# Patient Record
Sex: FEMALE | URBAN - NONMETROPOLITAN AREA
[De-identification: names, ages, dates, MRNs, and addresses within clinical notes are randomized per-mention and may not be internally consistent; named-entity substitution may affect disease eponyms.]

---

## 2023-11-15 ENCOUNTER — OCCMED (OUTPATIENT)
Dept: URGENT CARE | Facility: CLINIC | Age: 48
End: 2023-11-15

## 2023-11-15 DIAGNOSIS — Z02.1 PRE-EMPLOYMENT EXAMINATION: Primary | ICD-10-CM

## 2023-12-21 ENCOUNTER — HOSPITAL ENCOUNTER (EMERGENCY)
Facility: HOSPITAL | Age: 48
Discharge: HOME | DRG: 552 | End: 2023-12-21
Attending: EMERGENCY MEDICINE
Payer: MEDICARE

## 2023-12-21 ENCOUNTER — APPOINTMENT (EMERGENCY)
Dept: RADIOLOGY | Facility: HOSPITAL | Age: 48
DRG: 552 | End: 2023-12-21
Payer: MEDICARE

## 2023-12-21 VITALS
HEART RATE: 74 BPM | WEIGHT: 180 LBS | HEIGHT: 64 IN | SYSTOLIC BLOOD PRESSURE: 148 MMHG | RESPIRATION RATE: 16 BRPM | DIASTOLIC BLOOD PRESSURE: 66 MMHG | OXYGEN SATURATION: 97 % | TEMPERATURE: 97.8 F | BODY MASS INDEX: 30.73 KG/M2

## 2023-12-21 DIAGNOSIS — M54.50 ACUTE LOW BACK PAIN: Primary | ICD-10-CM

## 2023-12-21 PROCEDURE — 99283 EMERGENCY DEPT VISIT LOW MDM: CPT

## 2023-12-21 PROCEDURE — 99284 EMERGENCY DEPT VISIT MOD MDM: CPT | Performed by: EMERGENCY MEDICINE

## 2023-12-21 PROCEDURE — 72100 X-RAY EXAM L-S SPINE 2/3 VWS: CPT

## 2023-12-21 PROCEDURE — 96372 THER/PROPH/DIAG INJ SC/IM: CPT

## 2023-12-21 RX ORDER — KETOROLAC TROMETHAMINE 30 MG/ML
15 INJECTION, SOLUTION INTRAMUSCULAR; INTRAVENOUS ONCE
Status: COMPLETED | OUTPATIENT
Start: 2023-12-21 | End: 2023-12-21

## 2023-12-21 RX ORDER — LIDOCAINE 50 MG/G
1 PATCH TOPICAL DAILY
Qty: 10 PATCH | Refills: 0 | Status: SHIPPED | OUTPATIENT
Start: 2023-12-21

## 2023-12-21 RX ORDER — LIDOCAINE 50 MG/G
1 PATCH TOPICAL ONCE
Status: DISCONTINUED | OUTPATIENT
Start: 2023-12-21 | End: 2023-12-22 | Stop reason: HOSPADM

## 2023-12-21 RX ORDER — NAPROXEN 500 MG/1
500 TABLET ORAL 2 TIMES DAILY WITH MEALS
Qty: 30 TABLET | Refills: 0 | Status: SHIPPED | OUTPATIENT
Start: 2023-12-21 | End: 2023-12-26

## 2023-12-21 RX ORDER — METHOCARBAMOL 500 MG/1
1000 TABLET, FILM COATED ORAL ONCE
Status: COMPLETED | OUTPATIENT
Start: 2023-12-21 | End: 2023-12-21

## 2023-12-21 RX ORDER — METHOCARBAMOL 500 MG/1
1000 TABLET, FILM COATED ORAL
Qty: 7 TABLET | Refills: 0 | Status: SHIPPED | OUTPATIENT
Start: 2023-12-21

## 2023-12-21 RX ADMIN — KETOROLAC TROMETHAMINE 15 MG: 30 INJECTION, SOLUTION INTRAMUSCULAR; INTRAVENOUS at 22:28

## 2023-12-21 RX ADMIN — LIDOCAINE 5% 1 PATCH: 700 PATCH TOPICAL at 22:32

## 2023-12-21 RX ADMIN — METHOCARBAMOL TABLETS 1000 MG: 500 TABLET, COATED ORAL at 22:26

## 2023-12-21 NOTE — Clinical Note
Luh Chairez was seen and treated in our emergency department on 12/21/2023.                Diagnosis: acute low back pain    Luh  .    She may return on this date: 12/23/2023         If you have any questions or concerns, please don't hesitate to call.      Willie Mcdaniel, DO    ______________________________           _______________          _______________  Hospital Representative                              Date                                Time

## 2023-12-22 ENCOUNTER — HOSPITAL ENCOUNTER (INPATIENT)
Facility: HOSPITAL | Age: 48
LOS: 3 days | Discharge: HOME HEALTH | DRG: 552 | End: 2023-12-26
Attending: EMERGENCY MEDICINE | Admitting: HOSPITALIST
Payer: MEDICARE

## 2023-12-22 ENCOUNTER — APPOINTMENT (EMERGENCY)
Dept: CT IMAGING | Facility: HOSPITAL | Age: 48
DRG: 552 | End: 2023-12-22
Payer: MEDICARE

## 2023-12-22 DIAGNOSIS — R20.0 NUMBNESS AND TINGLING OF RIGHT UPPER AND LOWER EXTREMITY: ICD-10-CM

## 2023-12-22 DIAGNOSIS — R20.2 NUMBNESS AND TINGLING OF RIGHT UPPER AND LOWER EXTREMITY: ICD-10-CM

## 2023-12-22 DIAGNOSIS — R91.1 INCIDENTAL PULMONARY NODULE: ICD-10-CM

## 2023-12-22 DIAGNOSIS — M54.16 LUMBAR RADICULOPATHY: ICD-10-CM

## 2023-12-22 DIAGNOSIS — E55.9 VITAMIN D INSUFFICIENCY: ICD-10-CM
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
## 2023-12-22 DIAGNOSIS — R20.2 PARESTHESIAS: ICD-10-CM

## 2023-12-22 DIAGNOSIS — M54.50 LOW BACK PAIN: ICD-10-CM

## 2023-12-22 DIAGNOSIS — R29.898 LEG WEAKNESS, BILATERAL: ICD-10-CM

## 2023-12-22 PROBLEM — F17.200 SMOKER: Status: ACTIVE | Noted: 2023-12-22

## 2023-12-22 PROBLEM — R82.71 BACTERIURIA: Status: ACTIVE | Noted: 2023-12-22

## 2023-12-22 LAB
ALBUMIN SERPL BCP-MCNC: 4.2 G/DL (ref 3.5–5)
ALP SERPL-CCNC: 43 U/L (ref 34–104)
ALT SERPL W P-5'-P-CCNC: 18 U/L (ref 7–52)
AMORPH PHOS CRY URNS QL MICRO: ABNORMAL /HPF
ANION GAP SERPL CALCULATED.3IONS-SCNC: 9 MMOL/L
APTT PPP: 26 SECONDS (ref 23–37)
AST SERPL W P-5'-P-CCNC: 12 U/L (ref 13–39)
BACTERIA UR QL AUTO: ABNORMAL /HPF
BASOPHILS # BLD AUTO: 0.02 THOUSANDS/ÂΜL (ref 0–0.1)
BASOPHILS NFR BLD AUTO: 0 % (ref 0–1)
BILIRUB DIRECT SERPL-MCNC: 0.06 MG/DL (ref 0–0.2)
BILIRUB SERPL-MCNC: 0.35 MG/DL (ref 0.2–1)
BILIRUB UR QL STRIP: NEGATIVE
BUN SERPL-MCNC: 18 MG/DL (ref 5–25)
CALCIUM SERPL-MCNC: 8.8 MG/DL (ref 8.4–10.2)
CARDIAC TROPONIN I PNL SERPL HS: <2 NG/L
CARDIAC TROPONIN I PNL SERPL HS: <2 NG/L
CHLORIDE SERPL-SCNC: 105 MMOL/L (ref 96–108)
CK SERPL-CCNC: 123 U/L (ref 26–192)
CLARITY UR: ABNORMAL
CO2 SERPL-SCNC: 23 MMOL/L (ref 21–32)
COLOR UR: ABNORMAL
CREAT SERPL-MCNC: 0.75 MG/DL (ref 0.6–1.3)
EOSINOPHIL # BLD AUTO: 0.08 THOUSAND/ÂΜL (ref 0–0.61)
EOSINOPHIL NFR BLD AUTO: 1 % (ref 0–6)
ERYTHROCYTE [DISTWIDTH] IN BLOOD BY AUTOMATED COUNT: 12.3 % (ref 11.6–15.1)
EXT PREGNANCY TEST URINE: NEGATIVE
EXT. CONTROL: NORMAL
FLUAV RNA RESP QL NAA+PROBE: NEGATIVE
FLUBV RNA RESP QL NAA+PROBE: NEGATIVE
GFR SERPL CREATININE-BSD FRML MDRD: 93 ML/MIN/1.73SQ M
GLUCOSE SERPL-MCNC: 85 MG/DL (ref 65–140)
GLUCOSE UR STRIP-MCNC: NEGATIVE MG/DL
HCT VFR BLD AUTO: 42.5 % (ref 34.8–46.1)
HGB BLD-MCNC: 13.8 G/DL (ref 11.5–15.4)
HGB UR QL STRIP.AUTO: NEGATIVE
IMM GRANULOCYTES # BLD AUTO: 0.03 THOUSAND/UL (ref 0–0.2)
IMM GRANULOCYTES NFR BLD AUTO: 0 % (ref 0–2)
INR PPP: 1.05 (ref 0.84–1.19)
KETONES UR STRIP-MCNC: NEGATIVE MG/DL
LACTATE SERPL-SCNC: 0.6 MMOL/L (ref 0.5–2)
LEUKOCYTE ESTERASE UR QL STRIP: ABNORMAL
LIPASE SERPL-CCNC: 30 U/L (ref 11–82)
LYMPHOCYTES # BLD AUTO: 2.16 THOUSANDS/ÂΜL (ref 0.6–4.47)
LYMPHOCYTES NFR BLD AUTO: 25 % (ref 14–44)
MCH RBC QN AUTO: 31.1 PG (ref 26.8–34.3)
MCHC RBC AUTO-ENTMCNC: 32.5 G/DL (ref 31.4–37.4)
MCV RBC AUTO: 96 FL (ref 82–98)
MONOCYTES # BLD AUTO: 0.47 THOUSAND/ÂΜL (ref 0.17–1.22)
MONOCYTES NFR BLD AUTO: 5 % (ref 4–12)
NEUTROPHILS # BLD AUTO: 5.93 THOUSANDS/ÂΜL (ref 1.85–7.62)
NEUTS SEG NFR BLD AUTO: 69 % (ref 43–75)
NITRITE UR QL STRIP: NEGATIVE
NON-SQ EPI CELLS URNS QL MICRO: ABNORMAL /HPF
NRBC BLD AUTO-RTO: 0 /100 WBCS
PH UR STRIP.AUTO: 6 [PH]
PLATELET # BLD AUTO: 211 THOUSANDS/UL (ref 149–390)
PMV BLD AUTO: 9.9 FL (ref 8.9–12.7)
POTASSIUM SERPL-SCNC: 3.9 MMOL/L (ref 3.5–5.3)
PROT SERPL-MCNC: 6.1 G/DL (ref 6.4–8.4)
PROT UR STRIP-MCNC: NEGATIVE MG/DL
PROTHROMBIN TIME: 13.6 SECONDS (ref 11.6–14.5)
RBC # BLD AUTO: 4.44 MILLION/UL (ref 3.81–5.12)
RBC #/AREA URNS AUTO: ABNORMAL /HPF
RSV RNA RESP QL NAA+PROBE: NEGATIVE
SARS-COV-2 RNA RESP QL NAA+PROBE: NEGATIVE
SODIUM SERPL-SCNC: 137 MMOL/L (ref 135–147)
SP GR UR STRIP.AUTO: <=1.005 (ref 1–1.03)
UROBILINOGEN UR QL STRIP.AUTO: 0.2 E.U./DL
WBC # BLD AUTO: 8.69 THOUSAND/UL (ref 4.31–10.16)
WBC #/AREA URNS AUTO: ABNORMAL /HPF

## 2023-12-22 PROCEDURE — 84484 ASSAY OF TROPONIN QUANT: CPT | Performed by: EMERGENCY MEDICINE

## 2023-12-22 PROCEDURE — 99222 1ST HOSP IP/OBS MODERATE 55: CPT | Performed by: PHYSICIAN ASSISTANT

## 2023-12-22 PROCEDURE — 83690 ASSAY OF LIPASE: CPT | Performed by: EMERGENCY MEDICINE

## 2023-12-22 PROCEDURE — 85730 THROMBOPLASTIN TIME PARTIAL: CPT | Performed by: EMERGENCY MEDICINE

## 2023-12-22 PROCEDURE — 96374 THER/PROPH/DIAG INJ IV PUSH: CPT

## 2023-12-22 PROCEDURE — 81001 URINALYSIS AUTO W/SCOPE: CPT | Performed by: EMERGENCY MEDICINE

## 2023-12-22 PROCEDURE — G1004 CDSM NDSC: HCPCS

## 2023-12-22 PROCEDURE — 72125 CT NECK SPINE W/O DYE: CPT

## 2023-12-22 PROCEDURE — 96375 TX/PRO/DX INJ NEW DRUG ADDON: CPT

## 2023-12-22 PROCEDURE — 85610 PROTHROMBIN TIME: CPT | Performed by: EMERGENCY MEDICINE

## 2023-12-22 PROCEDURE — 93005 ELECTROCARDIOGRAM TRACING: CPT

## 2023-12-22 PROCEDURE — 85025 COMPLETE CBC W/AUTO DIFF WBC: CPT | Performed by: EMERGENCY MEDICINE

## 2023-12-22 PROCEDURE — 80076 HEPATIC FUNCTION PANEL: CPT | Performed by: EMERGENCY MEDICINE

## 2023-12-22 PROCEDURE — 99284 EMERGENCY DEPT VISIT MOD MDM: CPT

## 2023-12-22 PROCEDURE — 80048 BASIC METABOLIC PNL TOTAL CA: CPT | Performed by: EMERGENCY MEDICINE

## 2023-12-22 PROCEDURE — 81025 URINE PREGNANCY TEST: CPT | Performed by: EMERGENCY MEDICINE

## 2023-12-22 PROCEDURE — 82550 ASSAY OF CK (CPK): CPT | Performed by: EMERGENCY MEDICINE

## 2023-12-22 PROCEDURE — 99285 EMERGENCY DEPT VISIT HI MDM: CPT | Performed by: EMERGENCY MEDICINE

## 2023-12-22 PROCEDURE — 70450 CT HEAD/BRAIN W/O DYE: CPT

## 2023-12-22 PROCEDURE — 71260 CT THORAX DX C+: CPT

## 2023-12-22 PROCEDURE — 74177 CT ABD & PELVIS W/CONTRAST: CPT

## 2023-12-22 PROCEDURE — 0241U HB NFCT DS VIR RESP RNA 4 TRGT: CPT | Performed by: EMERGENCY MEDICINE

## 2023-12-22 PROCEDURE — 36415 COLL VENOUS BLD VENIPUNCTURE: CPT | Performed by: EMERGENCY MEDICINE

## 2023-12-22 PROCEDURE — 83605 ASSAY OF LACTIC ACID: CPT | Performed by: EMERGENCY MEDICINE

## 2023-12-22 RX ORDER — FENTANYL CITRATE 50 UG/ML
25 INJECTION, SOLUTION INTRAMUSCULAR; INTRAVENOUS ONCE
Status: DISCONTINUED | OUTPATIENT
Start: 2023-12-22 | End: 2023-12-22 | Stop reason: HOSPADM

## 2023-12-22 RX ORDER — METHOCARBAMOL 500 MG/1
1000 TABLET, FILM COATED ORAL
Status: DISCONTINUED | OUTPATIENT
Start: 2023-12-22 | End: 2023-12-23

## 2023-12-22 RX ORDER — HEPARIN SODIUM 5000 [USP'U]/ML
5000 INJECTION, SOLUTION INTRAVENOUS; SUBCUTANEOUS EVERY 8 HOURS SCHEDULED
Status: DISCONTINUED | OUTPATIENT
Start: 2023-12-22 | End: 2023-12-25

## 2023-12-22 RX ORDER — TRAMADOL HYDROCHLORIDE 50 MG/1
50 TABLET ORAL EVERY 6 HOURS PRN
Status: DISCONTINUED | OUTPATIENT
Start: 2023-12-22 | End: 2023-12-23

## 2023-12-22 RX ORDER — CEFTRIAXONE 1 G/50ML
1000 INJECTION, SOLUTION INTRAVENOUS EVERY 24 HOURS
Status: COMPLETED | OUTPATIENT
Start: 2023-12-22 | End: 2023-12-25

## 2023-12-22 RX ORDER — MORPHINE SULFATE 4 MG/ML
4 INJECTION, SOLUTION INTRAMUSCULAR; INTRAVENOUS ONCE
Status: COMPLETED | OUTPATIENT
Start: 2023-12-22 | End: 2023-12-22

## 2023-12-22 RX ORDER — NICOTINE 21 MG/24HR
1 PATCH, TRANSDERMAL 24 HOURS TRANSDERMAL DAILY
Status: DISCONTINUED | OUTPATIENT
Start: 2023-12-23 | End: 2023-12-26 | Stop reason: HOSPADM

## 2023-12-22 RX ORDER — ONDANSETRON 2 MG/ML
4 INJECTION INTRAMUSCULAR; INTRAVENOUS ONCE
Status: DISCONTINUED | OUTPATIENT
Start: 2023-12-22 | End: 2023-12-22

## 2023-12-22 RX ORDER — ONDANSETRON 2 MG/ML
4 INJECTION INTRAMUSCULAR; INTRAVENOUS ONCE
Status: COMPLETED | OUTPATIENT
Start: 2023-12-22 | End: 2023-12-22

## 2023-12-22 RX ORDER — ONDANSETRON 2 MG/ML
4 INJECTION INTRAMUSCULAR; INTRAVENOUS EVERY 6 HOURS PRN
Status: DISCONTINUED | OUTPATIENT
Start: 2023-12-22 | End: 2023-12-26 | Stop reason: HOSPADM

## 2023-12-22 RX ORDER — LIDOCAINE 50 MG/G
1 PATCH TOPICAL DAILY
Status: DISCONTINUED | OUTPATIENT
Start: 2023-12-23 | End: 2023-12-23

## 2023-12-22 RX ADMIN — IOHEXOL 100 ML: 350 INJECTION, SOLUTION INTRAVENOUS at 15:53

## 2023-12-22 RX ADMIN — ONDANSETRON 4 MG: 2 INJECTION INTRAMUSCULAR; INTRAVENOUS at 15:01

## 2023-12-22 RX ADMIN — MORPHINE SULFATE 4 MG: 4 INJECTION, SOLUTION INTRAMUSCULAR; INTRAVENOUS at 15:01

## 2023-12-22 RX ADMIN — TRAMADOL HYDROCHLORIDE 50 MG: 50 TABLET, COATED ORAL at 22:15

## 2023-12-22 RX ADMIN — HEPARIN SODIUM 5000 UNITS: 5000 INJECTION INTRAVENOUS; SUBCUTANEOUS at 22:15

## 2023-12-22 RX ADMIN — CEFTRIAXONE 1000 MG: 1 INJECTION, SOLUTION INTRAVENOUS at 22:14

## 2023-12-22 NOTE — ED PROVIDER NOTES
History  Chief Complaint   Patient presents with    Numbness     Patient reports pressure in the middle of her back and beginning this morning she developed numbness in b/l arms/hands. Back injury two days ago while at work     49 yo RHD female presents with worsening LBP and numbness to finger tips and anterior thighs with leg weakness.  Patient sustained a slip and fall on black ice on 12/20/2023 this is described in prior emergency division visit from yesterday she sustained a fall forward landing on her hands and knees.  There was no head strike or loss of consciousness her only anticoagulant is a baby aspirin.  Patient was able to get up right away and ambulate after the fall.  She then gradually developed nonradiating low back pain.  She had no numbness tingling or weakness yesterday she was evaluated and underwent a lumbar spine plain film which was read out as negative.  She was prescribed Naprosyn which she is currently taking as well as Lidoderm patches.  Patient woke up this morning with new onset of symptoms she is complaining of worsening middle back pressure she points to the low back region numbness to the fingertips only.  And she complains of left greater than right leg numbness she complains of it going from in the anterior thigh down to the level of the knee skipping the knee and then being present in her ankles it is in the same distribution on the right leg.  Her legs feel more weak.  She had a worsening tingling sensation in the buttocks after having a bowel movement here there is no history of bowel or bladder incontinence she had no subsequent falls or trauma since the initial fall on 12/20/21 she does have some slight abdominal pain.  She denies she does have a generalized headache.  She denies any neck pain there is no weakness to the upper extremities there is no lower extremity edema.  She had no fever but feels cold she denies chest pain shortness of breath or pleuritic pain.  She is  denying any upper back pain. No IVDA no previous instrmentation to back no h/o CA  Past medical history COPD hyperlipidemia tobacco use disorder denies prior low back pain past surgical history is appendectomy        Prior to Admission Medications   Prescriptions Last Dose Informant Patient Reported? Taking?   lidocaine (Lidoderm) 5 % 12/21/2023  No Yes   Sig: Apply 1 patch topically over 12 hours daily Remove & Discard patch within 12 hours or as directed by MD   methocarbamol (ROBAXIN) 500 mg tablet 12/21/2023  No Yes   Sig: Take 2 tablets (1,000 mg total) by mouth daily at bedtime as needed for muscle spasms   naproxen (Naprosyn) 500 mg tablet 12/22/2023  No Yes   Sig: Take 1 tablet (500 mg total) by mouth 2 (two) times a day with meals      Facility-Administered Medications: None       Past Medical History:   Diagnosis Date    COPD (chronic obstructive pulmonary disease) (HCC)        Past Surgical History:   Procedure Laterality Date    APPENDECTOMY      HERNIA REPAIR         History reviewed. No pertinent family history.  I have reviewed and agree with the history as documented.    E-Cigarette/Vaping     E-Cigarette/Vaping Substances     Social History     Tobacco Use    Smoking status: Every Day     Current packs/day: 2.00     Types: Cigarettes    Smokeless tobacco: Never   Substance Use Topics    Alcohol use: Not Currently     Comment: social drinker    Drug use: Never       Review of Systems   Constitutional:  Positive for activity change. Negative for chills and fever.   HENT:  Negative for congestion, ear pain, rhinorrhea, sneezing and sore throat.    Eyes:  Negative for discharge and visual disturbance.   Respiratory:  Negative for cough and shortness of breath.    Cardiovascular:  Negative for chest pain and leg swelling.   Gastrointestinal:  Positive for abdominal pain (LLQ). Negative for blood in stool, diarrhea, nausea and vomiting.   Endocrine: Negative for polyuria.   Genitourinary:  Negative for  difficulty urinating, dysuria, frequency and urgency.   Musculoskeletal:  Positive for back pain. Negative for myalgias.   Skin:  Negative for rash.   Neurological:  Positive for weakness (lower legs), numbness and headaches. Negative for dizziness and speech difficulty.   Hematological:  Negative for adenopathy.   Psychiatric/Behavioral:  Negative for confusion.    All other systems reviewed and are negative.      Physical Exam  Physical Exam  Vitals and nursing note reviewed. Exam conducted with a chaperone present.   Constitutional:       General: She is in acute distress.      Appearance: She is not ill-appearing, toxic-appearing or diaphoretic.   HENT:      Head: Normocephalic.      Right Ear: There is impacted cerumen.      Left Ear: Tympanic membrane normal.      Nose: Nose normal. No congestion or rhinorrhea.      Mouth/Throat:      Mouth: Mucous membranes are moist.      Pharynx: No oropharyngeal exudate or posterior oropharyngeal erythema.   Eyes:      General:         Right eye: No discharge.         Left eye: No discharge.      Conjunctiva/sclera: Conjunctivae normal.      Pupils: Pupils are equal, round, and reactive to light.   Cardiovascular:      Rate and Rhythm: Normal rate and regular rhythm.      Pulses: Normal pulses.   Pulmonary:      Effort: Pulmonary effort is normal. No respiratory distress.      Breath sounds: Normal breath sounds. No stridor. No wheezing, rhonchi or rales.   Chest:      Chest wall: No tenderness.   Abdominal:      General: Bowel sounds are normal. There is no distension.      Tenderness: There is abdominal tenderness (LLQ mild). There is no right CVA tenderness or guarding.   Genitourinary:     Comments: Rectal exam: perianal sensation intact normal rectal tone heme negative controls intact  Musculoskeletal:         General: No swelling or tenderness.      Cervical back: Normal range of motion and neck supple. No rigidity or tenderness.        Back:       Right lower leg:  No edema.      Left lower leg: No edema.      Comments: No tenderness to T spine; no CVA tenderness; midline lower L spine tenderness tender to sacrum. Mild tenderness above posterior left illiac crest   Lymphadenopathy:      Cervical: No cervical adenopathy.   Skin:     General: Skin is warm and dry.      Capillary Refill: Capillary refill takes less than 2 seconds.   Neurological:      Mental Status: She is alert and oriented to person, place, and time.      Cranial Nerves: No cranial nerve deficit.      Sensory: Sensory deficit present.      Motor: Weakness present.      Coordination: Coordination normal.      Deep Tendon Reflexes: Reflexes normal.      Comments: DTR 2+ biceps 2+ patellar absent ankle jerk bilaterally toes down going; light touch intact less prominent left leg.proprioception of left great toe intact. 2 pt discrim and double simultaneous intact;   MTR no pronator drift equal hand  radial unlar and median nerve isolated and found to be intact  Able to raise and hold each leg off bed with hip flexion count of 5 approx 3 cm off bed (worsens low back pain) able to adduct and abduct hips against resistant flex and ext of legs 5/5; great toe dorsiflexion and plantar and dorsiflexion intact    Psychiatric:         Mood and Affect: Mood normal.      Comments: flat         Vital Signs  ED Triage Vitals [12/22/23 1318]   Temperature Pulse Respirations Blood Pressure SpO2   98.5 °F (36.9 °C) 74 18 135/65 98 %      Temp Source Heart Rate Source Patient Position - Orthostatic VS BP Location FiO2 (%)   Temporal Monitor Lying Left arm --      Pain Score       9           Vitals:    12/22/23 1622 12/22/23 1713 12/22/23 1945 12/22/23 2116   BP: 127/60 102/52 127/64 110/71   Pulse: 72 69 68 80   Patient Position - Orthostatic VS:  Lying Lying          Visual Acuity      ED Medications  Medications   lidocaine (LIDODERM) 5 % patch 1 patch (has no administration in time range)   methocarbamol (ROBAXIN) tablet  1,000 mg (has no administration in time range)   ondansetron (ZOFRAN) injection 4 mg (has no administration in time range)   nicotine (NICODERM CQ) 21 mg/24 hr TD 24 hr patch 1 patch (has no administration in time range)   heparin (porcine) subcutaneous injection 5,000 Units (5,000 Units Subcutaneous Given 12/22/23 2215)   traMADol (ULTRAM) tablet 50 mg (50 mg Oral Given 12/22/23 2215)   morphine injection 2 mg (has no administration in time range)   cefTRIAXone (ROCEPHIN) IVPB (premix in dextrose) 1,000 mg 50 mL (1,000 mg Intravenous New Bag 12/22/23 2214)   ondansetron (ZOFRAN) injection 4 mg (4 mg Intravenous Given 12/22/23 1501)   morphine injection 4 mg (4 mg Intravenous Given 12/22/23 1501)   iohexol (OMNIPAQUE) 350 MG/ML injection (MULTI-DOSE) 100 mL (100 mL Intravenous Given 12/22/23 1553)       Diagnostic Studies  Results Reviewed       Procedure Component Value Units Date/Time    HS Troponin I 2hr [[id number]] Collected: 12/22/23 1711    Lab Status: Final result Specimen: Blood from Arm, Left Updated: 12/22/23 1745     hs TnI 2hr <2 ng/L      Delta 2hr hsTnI --    Lactic acid, plasma (w/reflex if result > 2.0) [[id number]]  (Normal) Collected: 12/22/23 1618    Lab Status: Final result Specimen: Blood from Arm, Left Updated: 12/22/23 1639     LACTIC ACID 0.6 mmol/L     Narrative:      Result may be elevated if tourniquet was used during collection.    Protime-INR [[id number]]  (Normal) Collected: 12/22/23 1457    Lab Status: Final result Specimen: Blood from Arm, Left Updated: 12/22/23 1613     Protime 13.6 seconds      INR 1.05    APTT [[id number]]  (Normal) Collected: 12/22/23 1457    Lab Status: Final result Specimen: Blood from Arm, Left Updated: 12/22/23 1613     PTT 26 seconds     Lipase [[id number]]  (Normal) Collected: 12/22/23 1457    Lab Status: Final result Specimen: Blood from Arm, Left Updated: 12/22/23 1607     Lipase 30 u/L     FLU/RSV/COVID - if FLU/RSV clinically relevant [[id number]]  (Normal)  Collected: 12/22/23 1457    Lab Status: Final result Specimen: Nares from Nose Updated: 12/22/23 1600     SARS-CoV-2 Negative     INFLUENZA A PCR Negative     INFLUENZA B PCR Negative     RSV PCR Negative    Narrative:      FOR PEDIATRIC PATIENTS - copy/paste COVID Guidelines URL to browser: [email or web address]    SARS-CoV-2 assay is a Nucleic Acid Amplification assay intended for the  qualitative detection of nucleic acid from SARS-CoV-2 in nasopharyngeal  swabs. Results are for the presumptive identification of SARS-CoV-2 RNA.    Positive results are indicative of infection with SARS-CoV-2, the virus  causing COVID-19, but do not rule out bacterial infection or co-infection  with other viruses. Laboratories within the [place] and its  territories are required to report all positive results to the appropriate  public health authorities. Negative results do not preclude SARS-CoV-2  infection and should not be used as the sole basis for treatment or other  patient management decisions. Negative results must be combined with  clinical observations, patient history, and epidemiological information.  This test has not been FDA cleared or approved.    This test has been authorized by FDA under an Emergency Use Authorization  (EUA). This test is only authorized for the duration of time the  declaration that circumstances exist justifying the authorization of the  emergency use of an in vitro diagnostic tests for detection of SARS-CoV-2  virus and/or diagnosis of COVID-19 infection under section 564(b)(1) of  the Act, 21 U.S.C. 360bbb-3(b)(1), unless the authorization is terminated  or revoked sooner. The test has been validated but independent review by FDA  and CLIA is pending.    Test performed using [employer]: This RT-PCR assay targets N2,  a region unique to SARS-CoV-2. A conserved region in the E-gene was chosen  for pan-Sarbecovirus detection which  includes SARS-CoV-2.    According to CMS-2020-01-R, this platform meets the definition of high-throughput technology.    HS Troponin 0hr (reflex protocol) [[id number]]  (Normal) Collected: 12/22/23 1457    Lab Status: Final result Specimen: Blood from Arm, Left Updated: 12/22/23 1536     hs TnI 0hr <2 ng/L     CK [[id number]]  (Normal) Collected: 12/22/23 1457    Lab Status: Final result Specimen: Blood from Arm, Left Updated: 12/22/23 1532     Total  U/L     Basic metabolic panel [[id number]] Collected: 12/22/23 1457    Lab Status: Final result Specimen: Blood from Arm, Left Updated: 12/22/23 1532     Sodium 137 mmol/L      Potassium 3.9 mmol/L      Chloride 105 mmol/L      CO2 23 mmol/L      ANION GAP 9 mmol/L      BUN 18 mg/dL      Creatinine 0.75 mg/dL      Glucose 85 mg/dL      Calcium 8.8 mg/dL      eGFR 93 ml/min/1.73sq m     Narrative:      National Kidney Disease Foundation guidelines for Chronic Kidney Disease (CKD):     Stage 1 with normal or high GFR (GFR > 90 mL/min/1.73 square meters)    Stage 2 Mild CKD (GFR = 60-89 mL/min/1.73 square meters)    Stage 3A Moderate CKD (GFR = 45-59 mL/min/1.73 square meters)    Stage 3B Moderate CKD (GFR = 30-44 mL/min/1.73 square meters)    Stage 4 Severe CKD (GFR = 15-29 mL/min/1.73 square meters)    Stage 5 End Stage CKD (GFR <15 mL/min/1.73 square meters)  Note: GFR calculation is accurate only with a steady state creatinine    Hepatic function panel [[id number]]  (Abnormal) Collected: 12/22/23 1457    Lab Status: Final result Specimen: Blood from Arm, Left Updated: 12/22/23 1532     Total Bilirubin 0.35 mg/dL      Bilirubin, Direct 0.06 mg/dL      Alkaline Phosphatase 43 U/L      AST 12 U/L      ALT 18 U/L      Total Protein 6.1 g/dL      Albumin 4.2 g/dL     Urine Microscopic [[id number]]  (Abnormal) Collected: 12/22/23 1457    Lab Status: Final result Specimen: Urine, Clean Catch Updated: 12/22/23 1531     RBC, UA 1-2 /hpf      WBC, UA 4-10 /hpf      Epithelial  Cells Moderate /hpf      Bacteria, UA Moderate /hpf      AMORPH PHOSPATES Moderate /hpf     UA w Reflex to Microscopic w Reflex to Culture [[id number]]  (Abnormal) Collected: 12/22/23 1457    Lab Status: Final result Specimen: Urine, Clean Catch Updated: 12/22/23 1517     Color, UA Light Yellow     Clarity, UA Slightly Cloudy     Specific Gravity, UA <=1.005     pH, UA 6.0     Leukocytes, UA Trace     Nitrite, UA Negative     Protein, UA Negative mg/dl      Glucose, UA Negative mg/dl      Ketones, UA Negative mg/dl      Urobilinogen, UA 0.2 E.U./dl      Bilirubin, UA Negative     Occult Blood, UA Negative    CBC and differential [[id number]] Collected: 12/22/23 1457    Lab Status: Final result Specimen: Blood from Arm, Left Updated: 12/22/23 1515     WBC 8.69 Thousand/uL      RBC 4.44 Million/uL      Hemoglobin 13.8 g/dL      Hematocrit 42.5 %      MCV 96 fL      MCH 31.1 pg      MCHC 32.5 g/dL      RDW 12.3 %      MPV 9.9 fL      Platelets 211 Thousands/uL      nRBC 0 /100 WBCs      Neutrophils Relative 69 %      Immat GRANS % 0 %      Lymphocytes Relative 25 %      Monocytes Relative 5 %      Eosinophils Relative 1 %      Basophils Relative 0 %      Neutrophils Absolute 5.93 Thousands/µL      Immature Grans Absolute 0.03 Thousand/uL      Lymphocytes Absolute 2.16 Thousands/µL      Monocytes Absolute 0.47 Thousand/µL      Eosinophils Absolute 0.08 Thousand/µL      Basophils Absolute 0.02 Thousands/µL     POCT pregnancy, urine [[id number]]  (Normal) Resulted: 12/22/23 1444    Lab Status: Final result Updated: 12/22/23 1444     EXT Preg Test, Ur Negative     Control Valid                   CT head without contrast   Final Result by Akil Bolanos MD (12/22 1911)      No intracranial hemorrhage or calvarial fracture.                  Workstation performed: [id number]         CT chest abdomen pelvis w contrast   Final Result by Chet Myers DO (12/22 1706)      1. No acute thoracic, abdominal or pelvic  injury. Specifically, no spinal fractures.      2. Moderate right foraminal disc protrusion suggested at L4-5 with associated moderate ipsilateral foraminal stenosis but no discernible compressive neuropathy.      3. Minimally prominent vascular confluence versus 3 mm nodule left lower pulmonary lobe (series 3/91). Based on current Fleischner Society 2017 Guidelines on incidental pulmonary nodule, optional follow-up CT at 12 months can be considered.         Workstation performed: [id number]         CT cervical spine without contrast   Final Result by Akil Bolanos MD (12/22 1640)      No cervical spine fracture or traumatic malalignment.                  Workstation performed: [id number]                    Procedures  ECG 12 Lead Documentation Only    Date/Time: 12/22/2023 1:45 PM    Performed by: Ana Dodson MD  Authorized by: Ana Dodson MD    Indications / Diagnosis:  Arm numbness  ECG reviewed by me, the ED Provider: yes    Patient location:  ED  Previous ECG:     Previous ECG:  Unavailable (no previous)  Rate:     ECG rate:  72    ECG rate assessment: normal    Rhythm:     Rhythm: sinus rhythm    QRS:     QRS axis:  Normal  Comments:       no acute ischemic changes           ED Course  ED Course as of 12/22/23 2242   Fri Dec 22, 2023   1531 Bladder scan 66ml   1531 Paitent c/o upper abdom pain will add lipase and lactic acid. Reexmained; abdm remains soft no rebound guarding will adminster fentanyl CT A/p is ordered   1917 Patient reassessed dysathesia to ant thighs improved only present occasionally has tingling to distal tips of all fingers   1934 TC with Dr. Leary of neruology   1940 Case d/w Dr. Leary of neurology recommends hospitalization at [facility]  for MRI of C-T-L spine with neruo consult in the AM will d/w patient   2033 Case presented to Laurent Castillo PAC checking attending   2038 Laurent Dillard                               SBIRT 20yo+      Flowsheet Row Most Recent  Value   Initial Alcohol Screen: US AUDIT-C     1. How often do you have a drink containing alcohol? 0 Filed at: 12/22/2023 1317   2. How many drinks containing alcohol do you have on a typical day you are drinking?  0 Filed at: 12/22/2023 1317   3a. Male UNDER 65: How often do you have five or more drinks on one occasion? 0 Filed at: 12/22/2023 1317   3b. FEMALE Any Age, or MALE 65+: How often do you have 4 or more drinks on one occassion? 0 Filed at: 12/22/2023 1317   Audit-C Score 0 Filed at: 12/22/2023 1317   JAXON: How many times in the past year have you...    Used an illegal drug or used a prescription medication for non-medical reasons? Never Filed at: 12/22/2023 1317                      Medical Decision Making  Mdm: Fall from standing onto the hands and knees 2 days ago.  Presented yesterday for evaluation of low back pain at that time was not experiencing any radicular symptoms underwent plain film of the lumbar spine which demonstrates no acute fracture today she is experiencing tingling to the upper extremities has a headache worsening low back pain with subjective leg weakness and numbness in the L2-L3 distribution of the anterior thighs and a skip lesion to the ankles.  She is not currently demonstrating signs or symptoms of cauda equina syndrome as she has intact perianal sensation and rectal tone.  Plan on pursuing CT of the head secondary to headache CT of the C-spine as well as CT of the chest abdomen pelvis to further image of the spine.  Will initiate symptomatic management with treatment of pain with morphine she is not currently demonstrating signs or symptoms of an anterior cord syndrome or central cord syndrome.  Will discuss with neurology once imaging is available    Laurent BOYKIN recommended obs to Dr. Velasquez service        Previous ED visit reviewed    Amount and/or Complexity of Data Reviewed  Labs: ordered.  Radiology: ordered.    Risk  Prescription drug management.  Decision regarding  hospitalization.             Disposition  Final diagnoses:   Fall on same level - 12/20/23 to hand and and knees   Low back pain   Leg weakness, bilateral   Paresthesias   Incidental pulmonary nodule - requires outpt followup 12 months; 3mm LLL     Time reflects when diagnosis was documented in both MDM as applicable and the Disposition within this note       Time User Action Codes Description Comment    12/22/2023  4:23 PM Ana Dodson Add [J10.1] Influenza A     12/22/2023  4:23 PM Ana Dodson Add [E86.0] Dehydration     12/22/2023  4:23 PM Ana Dodson Add [R79.0] Low magnesium level     12/22/2023  4:24 PM Ana Dodson Modify [E86.0] Dehydration     12/22/2023  4:24 PM Ana Dodson Remove [J10.1] Influenza A     12/22/2023  4:24 PM Ana Dodson Modify [R79.0] Low magnesium level     12/22/2023  4:24 PM Ana Dodson Remove [E86.0] Dehydration     12/22/2023  4:24 PM Ana Dodson Remove [R79.0] Low magnesium level     12/22/2023  7:48 PM Ana Dodson Add [W18.30XA] Fall on same level     12/22/2023  7:49 PM Ana Dodson Modify [W18.30XA] Fall on same level 12/20/23 to hand and and knees    12/22/2023  7:49 PM Ana Dodson Add [M54.50] Low back pain     12/22/2023  7:49 PM Ana Dodson Add [R29.898] Leg weakness, bilateral     12/22/2023  7:49 PM Ana Dodson Add [R20.2] Paresthesias     12/22/2023  7:49 PM Ana Dodson Modify [R20.2] Paresthesias all fingertips and bilateral anterior thighs and ankles    12/22/2023  7:50 PM Ana Dodson Add [R91.1] Incidental pulmonary nodule     12/22/2023  7:50 PM Ana Dodson Modify [R91.1] Incidental pulmonary nodule 3mm LLL recheck in 12 month    12/22/2023 10:09 PM Laurent Castillo Modify [R20.2] Paresthesias     12/22/2023 10:09 PM Laurent Castillo Modify [R91.1] Incidental pulmonary nodule     12/22/2023 10:09 PM Laurent Castillo Add [R20.0,  R20.2] Numbness and tingling of right upper and  lower extremity     12/22/2023 10:09 PM Laurent Castillo Modify [R20.2] Paresthesias all fingertips and bilateral anterior thighs and ankles    12/22/2023 10:09 PM Laurent Castillo Modify [R91.1] Incidental pulmonary nodule 3mm LLL recheck in 12 month    12/22/2023 10:09 PM Laurent Castillo Modify [R20.2] Paresthesias     12/22/2023 10:09 PM Laurent Castillo Modify [R91.1] Incidental pulmonary nodule     12/22/2023 10:09 PM Laurent Castillo Modify [R20.2] Paresthesias all fingertips and bilateral anterior thighs and ankles    12/22/2023 10:09 PM Laurent Castillo Modify [R91.1] Incidental pulmonary nodule 3mm LLL recheck in 12 month    12/22/2023 10:09 PM Laurent Castillo Modify [R20.2] Paresthesias     12/22/2023 10:09 PM Laurent Castillo Modify [R91.1] Incidental pulmonary nodule     12/22/2023 10:41 PM Ana Dodson Modify [R91.1] Incidental pulmonary nodule requires outpt followup 12 months    12/22/2023 10:41 PM Ana Dodson Modify [R91.1] Incidental pulmonary nodule requires outpt followup 12 months; 3mm LLL          ED Disposition       ED Disposition   Admit    Condition   Stable    Date/Time   Fri Dec 22, 2023 1948    Comment   Case was discussed with Laurent Newman PAC  and the patient's admission status was agreed to be Admission Status: observation status to the service of Dr. Cooper .               Follow-up Information    None         Current Discharge Medication List        CONTINUE these medications which have NOT CHANGED    Details   lidocaine (Lidoderm) 5 % Apply 1 patch topically over 12 hours daily Remove & Discard patch within 12 hours or as directed by MD  Qty: 10 patch, Refills: 0    Associated Diagnoses: Acute low back pain      methocarbamol (ROBAXIN) 500 mg tablet Take 2 tablets (1,000 mg total) by mouth daily at bedtime as needed for muscle spasms  Qty: 7 tablet, Refills: 0    Associated Diagnoses: Acute low back pain      naproxen (Naprosyn) 500 mg tablet Take 1 tablet (500 mg total) by mouth 2 (two) times a day with  meals  Qty: 30 tablet, Refills: 0    Associated Diagnoses: Acute low back pain             No discharge procedures on file.    PDMP Review         Value Time User    PDMP Reviewed  Yes 12/22/2023  9:04 PM Laurent Castillo PA-C            ED Provider  Electronically Signed by             Ana Dodson MD  12/22/23 7536

## 2023-12-22 NOTE — ED PROVIDER NOTES
History  Chief Complaint   Patient presents with    Back Pain    Knee Pain     Pt   stated  she tripped and fell  yesterday . Landed on left knee   pain persists . Pt c/o left knee  and lower  back pain      Luh Chairez is a 48 y.o. year old female with PMH of COPD presenting to the [facility] ED for evaluation of back pain after a fall. Patient states she slipped on black ice approximately 1445 yesterday. Fell forward, landing on arms and knees. Did not strike head. No LOC. She was able to get up right away and was walking around after the fall. Patient reporting persistent bilateral low back discomfort since that time. Pain does not radiate to the lower extremities. No associated weakness/numbness in LE. No bowel/bladder incontinence. Patient denies chest pain, dyspnea, abdominal pain or flank pain. Denies unexplained fevers, weight loss, neurologic symptoms, urinary retention. No history of bacteremia. Denies Hx of IVDU. No history of chronic steroid nor hx of cancer (breast, renal, lung). Patient does take 81mg ASA daily. Patient at this time states they have no knee pain contrary to triage. Patient has taken ibuprofen and applied bengay to area at home for symptomatic treatment.       History provided by:  Medical records and patient   used: No    Back Pain  Associated symptoms: no abdominal pain, no chest pain, no numbness and no weakness    Knee Pain  Associated symptoms: back pain    Associated symptoms: no neck pain        None       Past Medical History:   Diagnosis Date    COPD (chronic obstructive pulmonary disease) (HCC)        Past Surgical History:   Procedure Laterality Date    APPENDECTOMY      HERNIA REPAIR         History reviewed. No pertinent family history.  I have reviewed and agree with the history as documented.    E-Cigarette/Vaping     E-Cigarette/Vaping Substances     Social History     Tobacco Use    Smoking status: Every Day     Current packs/day: 2.00     Types:  Cigarettes    Smokeless tobacco: Never   Substance Use Topics    Alcohol use: Not Currently     Comment: social drinker    Drug use: Never       Review of Systems   Respiratory:  Negative for shortness of breath.    Cardiovascular:  Negative for chest pain.   Gastrointestinal:  Negative for abdominal pain, nausea and vomiting.   Genitourinary:  Negative for difficulty urinating and flank pain.   Musculoskeletal:  Positive for back pain. Negative for arthralgias, gait problem and neck pain.   Skin:  Negative for wound.   Neurological:  Negative for syncope, weakness and numbness.   All other systems reviewed and are negative.      Physical Exam  Physical Exam  Vitals and nursing note reviewed.   Constitutional:       General: She is not in acute distress.     Appearance: Normal appearance. She is not ill-appearing, toxic-appearing or diaphoretic.   HENT:      Head: Normocephalic and atraumatic. No Mendez's sign, abrasion, contusion, right periorbital erythema, left periorbital erythema or laceration.      Right Ear: External ear normal.      Left Ear: External ear normal.      Nose:      Right Nostril: No epistaxis.      Left Nostril: No epistaxis.      Mouth/Throat:      Mouth: No lacerations.   Eyes:      General:         Right eye: No discharge.         Left eye: No discharge.   Cardiovascular:      Rate and Rhythm: Normal rate and regular rhythm.   Pulmonary:      Effort: Pulmonary effort is normal. No respiratory distress.      Breath sounds: Normal breath sounds. No wheezing, rhonchi or rales.   Abdominal:      General: Abdomen is flat. There is no distension.      Tenderness: There is no abdominal tenderness. There is no right CVA tenderness, left CVA tenderness, guarding or rebound.   Musculoskeletal:      Right shoulder: No tenderness. Normal range of motion.      Left shoulder: No tenderness. Normal range of motion.      Right upper arm: No tenderness.      Left upper arm: No tenderness.      Right elbow:  No swelling. No tenderness.      Left elbow: No swelling. No tenderness.      Right forearm: No swelling, tenderness or bony tenderness.      Left forearm: No swelling, tenderness or bony tenderness.      Right wrist: No swelling, deformity, tenderness, bony tenderness or snuff box tenderness.      Left wrist: No swelling, deformity, tenderness, bony tenderness or snuff box tenderness.      Right hand: No tenderness or bony tenderness. Normal strength. Normal sensation.      Left hand: No tenderness or bony tenderness. Normal strength. Normal sensation.      Cervical back: Normal range of motion and neck supple. No rigidity, tenderness or bony tenderness.      Thoracic back: No tenderness or bony tenderness.      Lumbar back: Tenderness present. No bony tenderness.        Back:       Right hip: No deformity or bony tenderness.      Left hip: No deformity or bony tenderness.      Right upper leg: No tenderness.      Left upper leg: No tenderness.      Right knee: No swelling. No tenderness.      Left knee: No swelling. No tenderness.      Right lower leg: No bony tenderness. No edema.      Left lower leg: No bony tenderness. No edema.      Right ankle: No tenderness.      Left ankle: No tenderness.      Right foot: No tenderness.      Left foot: No tenderness.   Neurological:      General: No focal deficit present.      Mental Status: She is alert and oriented to person, place, and time. Mental status is at baseline.      GCS: GCS eye subscore is 4. GCS verbal subscore is 5. GCS motor subscore is 6.   Psychiatric:         Mood and Affect: Mood normal.         Behavior: Behavior normal.         Vital Signs  ED Triage Vitals [12/21/23 2149]   Temperature Pulse Respirations Blood Pressure SpO2   97.8 °F (36.6 °C) 74 16 148/66 97 %      Temp Source Heart Rate Source Patient Position - Orthostatic VS BP Location FiO2 (%)   Tympanic Monitor Lying Left arm --      Pain Score       6           Vitals:    12/21/23 2149   BP:  148/66   Pulse: 74   Patient Position - Orthostatic VS: Lying         Visual Acuity      ED Medications  Medications   ketorolac (TORADOL) injection 15 mg (15 mg Intramuscular Given 12/21/23 2228)   methocarbamol (ROBAXIN) tablet 1,000 mg (1,000 mg Oral Given 12/21/23 2226)       Diagnostic Studies  Results Reviewed       None                   XR spine lumbar 2 or 3 views injury   ED Interpretation by Willie Mcdaniel DO (12/21 2229)   No acute osseous abnormality.                 Procedures  Procedures         ED Course                               SBIRT 20yo+      Flowsheet Row Most Recent Value   Initial Alcohol Screen: US AUDIT-C     1. How often do you have a drink containing alcohol? 0 Filed at: 12/21/2023 2153   2. How many drinks containing alcohol do you have on a typical day you are drinking?  0 Filed at: 12/21/2023 2153   3a. Male UNDER 65: How often do you have five or more drinks on one occasion? 0 Filed at: 12/21/2023 2153   3b. FEMALE Any Age, or MALE 65+: How often do you have 4 or more drinks on one occassion? 0 Filed at: 12/21/2023 2153   Audit-C Score 0 Filed at: 12/21/2023 2153                      Medical Decision Making    48 y.o. female presenting for evaluation of back pain.  VSS, nontoxic appearing.  No headstrike or LOC. Denying weakness/numbness in LE.   Chart reviewed. US abdomen **/2022 did not demonstrate AAA.  Symptoms likely MSK strain/spasm.  Will order xray of lumbar spine to evaluate for acute lumbar vertebral fracture.    I have reviewed preliminary ED interpretation of x-rays with the patient. The patient understands that their x-rays will be interpreted independently by a radiologist at a later time. The patient is agreeable to discharge at this time and understands that they may be contacted after discharge from the emergency department pending formal xray interpretation by radiology.     Disposition: I have discussed with the patient our plan to discharge them from the ED  and the patient is in agreement with this plan.     Discharge Plan: Rx for naproxen, robaxin, referral to comprehensive spine. RTED precautions emphasized. The patient was provided a written after visit summary with strict RTED precautions.     Followup: I have discussed with the patient plan to follow up with their PCP and comprehensive spine. Contact information provided in AVS.    Amount and/or Complexity of Data Reviewed  Radiology: ordered and independent interpretation performed.    Risk  Prescription drug management.             Disposition  Final diagnoses:   Acute low back pain     Time reflects when diagnosis was documented in both MDM as applicable and the Disposition within this note       Time User Action Codes Description Comment    12/21/2023 10:27 PM Willie Mcdaniel Add [M54.50] Acute low back pain           ED Disposition       ED Disposition   Discharge    Condition   Stable    Date/Time   Thu Dec 21, 2023 2170    Comment   Luh Chairez discharge to home/self care.                   Follow-up Information       Follow up With Specialties Details Why Contact Info Additional Information    REMI Murry Nurse Practitioner Schedule an appointment as soon as possible for a visit  To make appointment for reevaluation in 3-5 days. [address] [facility] [address]  [phone]       [facility] Physical Therapy Schedule an appointment as soon as possible for a visit  To make appointment for reevaluation in 3-5 days. [phone]            Discharge Medication List as of 12/21/2023 10:55 PM        START taking these medications    Details   lidocaine (Lidoderm) 5 % Apply 1 patch topically over 12 hours daily Remove & Discard patch within 12 hours or as directed by MD, Starting Thu 12/21/2023, Normal      methocarbamol (ROBAXIN) 500 mg tablet Take 2 tablets (1,000 mg total) by mouth daily at bedtime as needed for muscle spasms, Starting Thu  12/21/2023, Normal      naproxen (Naprosyn) 500 mg tablet Take 1 tablet (500 mg total) by mouth 2 (two) times a day with meals, Starting Thu 12/21/2023, Normal             No discharge procedures on file.    PDMP Review       None            ED Provider  Electronically Signed by             Willie Mcdaniel DO  12/22/23 0900

## 2023-12-22 NOTE — Clinical Note
Case was discussed with Dr. Cooper  and the patient's admission status was agreed to be Admission Status: observation status to the service of Dr. Cooper .

## 2023-12-22 NOTE — DISCHARGE INSTRUCTIONS
You have been seen for low back pain. Please take tylenol, naproxen and robaxin as needed. Return to the emergency department if you develop worsening pain, weakness/numbness, bowel/bladder incontinence or any other symptoms of concern. Please follow up with your PCP and comprehensive spine by calling the number provided.

## 2023-12-22 NOTE — LETTER
[facility]  [address]   [place] [address]  Dept: [phone]    December 26, 2023     Patient: Luh Chairez   YOB: 1975   Date of Visit: 12/22/2023       To Whom it May Concern:    Luh Chairez is under my professional care. She was seen in the hospital from 12/22/2023 to 12/26/23. She may return to work on 12/28/22 with the following limitations light duty, no lifting >10-15 lbs  .    If you have any questions or concerns, please don't hesitate to call.         Sincerely,          Mary Feliciano PA-C

## 2023-12-23 PROBLEM — R29.898 LEG WEAKNESS, BILATERAL: Status: ACTIVE | Noted: 2023-12-23
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
## 2023-12-23 LAB
ALBUMIN SERPL BCP-MCNC: 3.9 G/DL (ref 3.5–5)
ALP SERPL-CCNC: 38 U/L (ref 34–104)
ALT SERPL W P-5'-P-CCNC: 16 U/L (ref 7–52)
ANION GAP SERPL CALCULATED.3IONS-SCNC: 9 MMOL/L
AST SERPL W P-5'-P-CCNC: 12 U/L (ref 13–39)
BASOPHILS # BLD AUTO: 0.02 THOUSANDS/ÂΜL (ref 0–0.1)
BASOPHILS NFR BLD AUTO: 0 % (ref 0–1)
BILIRUB SERPL-MCNC: 0.35 MG/DL (ref 0.2–1)
BUN SERPL-MCNC: 17 MG/DL (ref 5–25)
CALCIUM SERPL-MCNC: 8.6 MG/DL (ref 8.4–10.2)
CHLORIDE SERPL-SCNC: 105 MMOL/L (ref 96–108)
CO2 SERPL-SCNC: 22 MMOL/L (ref 21–32)
CREAT SERPL-MCNC: 0.77 MG/DL (ref 0.6–1.3)
EOSINOPHIL # BLD AUTO: 0.07 THOUSAND/ÂΜL (ref 0–0.61)
EOSINOPHIL NFR BLD AUTO: 2 % (ref 0–6)
ERYTHROCYTE [DISTWIDTH] IN BLOOD BY AUTOMATED COUNT: 12.2 % (ref 11.6–15.1)
EST. AVERAGE GLUCOSE BLD GHB EST-MCNC: 134 MG/DL
FOLATE SERPL-MCNC: 14.3 NG/ML
GFR SERPL CREATININE-BSD FRML MDRD: 90 ML/MIN/1.73SQ M
GLUCOSE SERPL-MCNC: 102 MG/DL (ref 65–140)
HBA1C MFR BLD: 6.3 %
HCT VFR BLD AUTO: 40.4 % (ref 34.8–46.1)
HGB BLD-MCNC: 13.2 G/DL (ref 11.5–15.4)
IMM GRANULOCYTES # BLD AUTO: 0.01 THOUSAND/UL (ref 0–0.2)
IMM GRANULOCYTES NFR BLD AUTO: 0 % (ref 0–2)
LYMPHOCYTES # BLD AUTO: 1.46 THOUSANDS/ÂΜL (ref 0.6–4.47)
LYMPHOCYTES NFR BLD AUTO: 31 % (ref 14–44)
MAGNESIUM SERPL-MCNC: 1.9 MG/DL (ref 1.9–2.7)
MCH RBC QN AUTO: 30.9 PG (ref 26.8–34.3)
MCHC RBC AUTO-ENTMCNC: 32.7 G/DL (ref 31.4–37.4)
MCV RBC AUTO: 95 FL (ref 82–98)
MONOCYTES # BLD AUTO: 0.33 THOUSAND/ÂΜL (ref 0.17–1.22)
MONOCYTES NFR BLD AUTO: 7 % (ref 4–12)
NEUTROPHILS # BLD AUTO: 2.87 THOUSANDS/ÂΜL (ref 1.85–7.62)
NEUTS SEG NFR BLD AUTO: 60 % (ref 43–75)
NRBC BLD AUTO-RTO: 0 /100 WBCS
PHOSPHATE SERPL-MCNC: 4.3 MG/DL (ref 2.7–4.5)
PLATELET # BLD AUTO: 190 THOUSANDS/UL (ref 149–390)
PMV BLD AUTO: 10.1 FL (ref 8.9–12.7)
POTASSIUM SERPL-SCNC: 3.7 MMOL/L (ref 3.5–5.3)
PROCALCITONIN SERPL-MCNC: <0.05 NG/ML
PROT SERPL-MCNC: 5.9 G/DL (ref 6.4–8.4)
RBC # BLD AUTO: 4.27 MILLION/UL (ref 3.81–5.12)
SODIUM SERPL-SCNC: 136 MMOL/L (ref 135–147)
T4 FREE SERPL-MCNC: 0.95 NG/DL (ref 0.61–1.12)
TSH SERPL DL<=0.05 MIU/L-ACNC: 5.14 UIU/ML (ref 0.45–4.5)
VIT B12 SERPL-MCNC: 250 PG/ML (ref 180–914)
WBC # BLD AUTO: 4.76 THOUSAND/UL (ref 4.31–10.16)

## 2023-12-23 PROCEDURE — 97163 PT EVAL HIGH COMPLEX 45 MIN: CPT | Performed by: PHYSICAL THERAPIST

## 2023-12-23 PROCEDURE — 83036 HEMOGLOBIN GLYCOSYLATED A1C: CPT

## 2023-12-23 PROCEDURE — 83735 ASSAY OF MAGNESIUM: CPT | Performed by: PHYSICIAN ASSISTANT

## 2023-12-23 PROCEDURE — 99232 SBSQ HOSP IP/OBS MODERATE 35: CPT | Performed by: HOSPITALIST

## 2023-12-23 PROCEDURE — 84443 ASSAY THYROID STIM HORMONE: CPT

## 2023-12-23 PROCEDURE — 99245 OFF/OP CONSLTJ NEW/EST HI 55: CPT | Performed by: PSYCHIATRY & NEUROLOGY

## 2023-12-23 PROCEDURE — 82607 VITAMIN B-12: CPT

## 2023-12-23 PROCEDURE — 97167 OT EVAL HIGH COMPLEX 60 MIN: CPT

## 2023-12-23 PROCEDURE — 84439 ASSAY OF FREE THYROXINE: CPT

## 2023-12-23 PROCEDURE — 82746 ASSAY OF FOLIC ACID SERUM: CPT

## 2023-12-23 PROCEDURE — 84145 PROCALCITONIN (PCT): CPT | Performed by: PHYSICIAN ASSISTANT

## 2023-12-23 PROCEDURE — 84100 ASSAY OF PHOSPHORUS: CPT | Performed by: PHYSICIAN ASSISTANT

## 2023-12-23 PROCEDURE — 85025 COMPLETE CBC W/AUTO DIFF WBC: CPT | Performed by: PHYSICIAN ASSISTANT

## 2023-12-23 PROCEDURE — 80053 COMPREHEN METABOLIC PANEL: CPT | Performed by: PHYSICIAN ASSISTANT

## 2023-12-23 RX ORDER — KETOROLAC TROMETHAMINE 30 MG/ML
15 INJECTION, SOLUTION INTRAMUSCULAR; INTRAVENOUS ONCE
Status: COMPLETED | OUTPATIENT
Start: 2023-12-23 | End: 2023-12-23

## 2023-12-23 RX ORDER — METHOCARBAMOL 500 MG/1
500 TABLET, FILM COATED ORAL
Status: DISCONTINUED | OUTPATIENT
Start: 2023-12-23 | End: 2023-12-23

## 2023-12-23 RX ORDER — METHOCARBAMOL 500 MG/1
500 TABLET, FILM COATED ORAL EVERY 8 HOURS SCHEDULED
Status: DISCONTINUED | OUTPATIENT
Start: 2023-12-23 | End: 2023-12-26 | Stop reason: HOSPADM

## 2023-12-23 RX ORDER — DIPHENHYDRAMINE HCL 25 MG
25 TABLET ORAL EVERY 6 HOURS PRN
Status: DISCONTINUED | OUTPATIENT
Start: 2023-12-23 | End: 2023-12-23

## 2023-12-23 RX ORDER — DIPHENHYDRAMINE HCL 25 MG
25 TABLET ORAL ONCE
Status: COMPLETED | OUTPATIENT
Start: 2023-12-23 | End: 2023-12-23

## 2023-12-23 RX ORDER — LIDOCAINE 50 MG/G
1 PATCH TOPICAL DAILY
Status: DISCONTINUED | OUTPATIENT
Start: 2023-12-23 | End: 2023-12-26 | Stop reason: HOSPADM

## 2023-12-23 RX ORDER — PREDNISONE 20 MG/1
60 TABLET ORAL DAILY
Status: DISCONTINUED | OUTPATIENT
Start: 2023-12-24 | End: 2023-12-25

## 2023-12-23 RX ORDER — METHYLPREDNISOLONE SODIUM SUCCINATE 40 MG/ML
40 INJECTION, POWDER, LYOPHILIZED, FOR SOLUTION INTRAMUSCULAR; INTRAVENOUS ONCE
Status: COMPLETED | OUTPATIENT
Start: 2023-12-23 | End: 2023-12-23

## 2023-12-23 RX ORDER — PREDNISONE 20 MG/1
60 TABLET ORAL DAILY
Status: DISCONTINUED | OUTPATIENT
Start: 2023-12-23 | End: 2023-12-23

## 2023-12-23 RX ADMIN — TRAMADOL HYDROCHLORIDE 50 MG: 50 TABLET, COATED ORAL at 10:34

## 2023-12-23 RX ADMIN — NICOTINE 1 PATCH: 21 PATCH, EXTENDED RELEASE TRANSDERMAL at 09:45

## 2023-12-23 RX ADMIN — HEPARIN SODIUM 5000 UNITS: 5000 INJECTION INTRAVENOUS; SUBCUTANEOUS at 14:52

## 2023-12-23 RX ADMIN — DIPHENHYDRAMINE HYDROCHLORIDE 25 MG: 25 TABLET ORAL at 22:09

## 2023-12-23 RX ADMIN — METHOCARBAMOL TABLETS 500 MG: 500 TABLET, COATED ORAL at 14:53

## 2023-12-23 RX ADMIN — CEFTRIAXONE 1000 MG: 1 INJECTION, SOLUTION INTRAVENOUS at 21:08

## 2023-12-23 RX ADMIN — MORPHINE SULFATE 2 MG: 2 INJECTION, SOLUTION INTRAMUSCULAR; INTRAVENOUS at 04:15

## 2023-12-23 RX ADMIN — HEPARIN SODIUM 5000 UNITS: 5000 INJECTION INTRAVENOUS; SUBCUTANEOUS at 21:07

## 2023-12-23 RX ADMIN — METHOCARBAMOL TABLETS 500 MG: 500 TABLET, COATED ORAL at 21:07

## 2023-12-23 RX ADMIN — LIDOCAINE 5% 1 PATCH: 700 PATCH TOPICAL at 04:10

## 2023-12-23 RX ADMIN — HEPARIN SODIUM 5000 UNITS: 5000 INJECTION INTRAVENOUS; SUBCUTANEOUS at 06:01

## 2023-12-23 RX ADMIN — ONDANSETRON 4 MG: 2 INJECTION INTRAMUSCULAR; INTRAVENOUS at 07:47

## 2023-12-23 RX ADMIN — KETOROLAC TROMETHAMINE 15 MG: 30 INJECTION, SOLUTION INTRAMUSCULAR at 23:09

## 2023-12-23 RX ADMIN — METHYLPREDNISOLONE SODIUM SUCCINATE 40 MG: 40 INJECTION, POWDER, FOR SOLUTION INTRAMUSCULAR; INTRAVENOUS at 14:52

## 2023-12-23 RX ADMIN — KETOROLAC TROMETHAMINE 15 MG: 30 INJECTION, SOLUTION INTRAMUSCULAR at 12:09

## 2023-12-23 NOTE — OCCUPATIONAL THERAPY NOTE
"    Occupational Therapy Evaluation     Patient Name: Luh Chairez  Today's Date: 12/23/2023  Problem List  Principal Problem:    Numbness and tingling of right upper and lower extremity  Active Problems:    Acute low back pain    Smoker    Bacteriuria    Past Medical History  Past Medical History:   Diagnosis Date    COPD (chronic obstructive pulmonary disease) (HCC)      Past Surgical History  Past Surgical History:   Procedure Laterality Date    APPENDECTOMY      HERNIA REPAIR               12/23/23 1013   OT Last Visit   OT Visit Date 12/23/23   Note Type   Note type Evaluation   Pain Assessment   Pain Assessment Tool 0-10   Pain Score 6   Pain Location/Orientation Location: Back   Restrictions/Precautions   Weight Bearing Precautions Per Order No   Other Precautions Chair Alarm;Bed Alarm;Fall Risk;Pain   Home Living   Type of Home House   Home Layout Multi-level;Bed/bath upstairs;Stairs to enter with rails;Other (Comment)  (FOSTE c HR; x2 FOS between floors; bathroom on 2nd, bedroom on 3rd floor)   Bathroom Shower/Tub Tub/shower unit   Bathroom Toilet Standard   Bathroom Accessibility Accessible   Home Equipment Other (Comment)  (no DME)   Additional Comments pt with no device at baseline during functional mobility   Prior Function   Level of [place] Independent with ADLs;Independent with functional mobility;Independent with IADLS   Lives With Spouse;Friend(s)   IADLs Family/Friend/Other provides transportation   Falls in the last 6 months 1 to 4  (fall at work)   Vocational Workers comp   Comments pt currently on workers comp due to fall at work resulting in back pain and related to this admission   Subjective   Subjective \"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 and fell\"   ADL   Where Assessed Other (Comment)  (bathroom)   Grooming Assistance 5  Supervision/Setup   LB Dressing Assistance 5  Supervision/Setup   Toileting Assistance  5  Supervision/Setup   Additional Comments pt performs all ADL " tasks with (S) and significantly increased time   Bed Mobility   Rolling R 5  Supervision   Additional items Increased time required  (demonstrates log roll technique)   Supine to Sit 5  Supervision   Additional items Bedrails;Increased time required   Sit to Supine   (seated in chair at end of session)   Additional Comments pt on RA during session; Spo2 WFL throughout session with no complaints of SOB   Transfers   Sit to Stand 5  Supervision   Additional items Increased time required   Stand to Sit 5  Supervision   Additional items Increased time required   Toilet transfer 5  Supervision   Additional items Increased time required;Standard toilet   Additional Comments pt offered RW use for ease of transfers and declines utilizing no device; no significant LOB or instability   Functional Mobility   Functional Mobility 5  Supervision   Additional Comments pt performs functional mobility in room ~40ft; pt limited by pain in low back; excessively slow   Additional items   (no device)   Balance   Static Sitting Good   Dynamic Sitting Good   Static Standing Fair +   Dynamic Standing Fair   Ambulatory Fair -   Activity Tolerance   Activity Tolerance Patient limited by fatigue;Patient limited by pain   RUE Assessment   RUE Assessment WFL   LUE Assessment   LUE Assessment WFL   Hand Function   Gross Motor Coordination Functional   Fine Motor Coordination Functional   Sensation   Light Touch Partial deficits in the RUE;Partial deficits in the LUE   Sharp/Dull Partial deficits in the RUE;Partial deficits in the LUE   Stereognosis No apparent deficits   Additional Comments pt reports numbness and tingiling in B fingers; no significant difficulties with dexterity noted during session   Proprioception   Proprioception No apparent deficits   Vision-Basic Assessment   Current Vision No visual deficits   Psychosocial   Psychosocial (WDL) WDL   Cognition   Overall Cognitive Status WFL   Arousal/Participation Alert   Attention Within  functional limits   Orientation Level Oriented X4   Memory Within functional limits   Following Commands Follows all commands and directions without difficulty   Assessment   Limitation Decreased ADL status;Decreased UE strength;Decreased Safe judgement during ADL;Decreased endurance;Decreased self-care trans;Decreased high-level ADLs;Decreased sensation   Assessment Pt is a 48 y.o. female seen for OT evaluation s/p admit to [facility] on 12/22/2023 w/ Numbness and tingling of right upper and lower extremity.  Comorbidities affecting pt's functional performance at time of assessment include:  numbness and tingling of R UE and R LE, acute low back pain, smoker, bacteriuria . Personal factors affecting pt at time of IE include:steps to enter environment, difficulty performing ADLS, difficulty performing IADLS , and health management . Prior to admission, pt was (I) with ADLs and IADLs with use of no device during mobility. Upon evaluation: Pt requires (S) level with use of no device during mobility 2* the following deficits impacting occupational performance: weakness, decreased strength, decreased balance, decreased tolerance, impaired sensation, decreased safety awareness, and increased pain. Pt to benefit from continued skilled OT tx while in the hospital to address deficits as defined above and maximize level of functional independence w ADL's and functional mobility. Occupational Performance areas to address include: grooming, bathing/shower, toilet hygiene, dressing, functional mobility, community mobility, and clothing management. The patient's raw score on the -PAC Daily Activity Inpatient Short Form is 22. A raw score of greater than or equal to 19 suggests the patient may benefit from discharge to home. Discharge recommendation at this time is level III minimum resource intensity.  Pt benefited from co-evaluation of skilled OT and PT therapists in order to most appropriately address functional deficits d/t  extensive assistance required for safe functional mobility, decreased activity tolerance, and regression from functioning level prior to admission and/or onset of present illness. OT/PT objectives were addressed separately; please see PT note for specific goal areas targeted.   Goals   Patient Goals to go home   Short Term Goal  pt will perform toilet transfers and hygiene at (I) level   Long Term Goal #1 pt will demonstrate functional mobility at (I) Level   Long Term Goal #2 pt will perform UB/LB bathing and grooming tasks at (I) level   Plan   Treatment Interventions ADL retraining;Functional transfer training;UE strengthening/ROM;Endurance training;Patient/family training;Equipment evaluation/education;Compensatory technique education;Activityengagement   Goal Expiration Date 01/06/24   OT Frequency 3-5x/wk   Discharge Recommendation   Rehab Resource Intensity Level, OT III (Minimum Resource Intensity)   AM-PAC Daily Activity Inpatient   Lower Body Dressing 3   Bathing 3   Toileting 4   Upper Body Dressing 4   Grooming 4   Eating 4   Daily Activity Raw Score 22   Daily Activity Standardized Score (Calc for Raw Score >=11) 47.1   AM-PAC Applied Cognition Inpatient   Following a Speech/Presentation 4   Understanding Ordinary Conversation 4   Taking Medications 4   Remembering Where Things Are Placed or Put Away 4   Remembering List of 4-5 Errands 4   Taking Care of Complicated Tasks 4   Applied Cognition Raw Score 24   Applied Cognition Standardized Score 62.21

## 2023-12-23 NOTE — PLAN OF CARE
Problem: PAIN - ADULT  Goal: Verbalizes/displays adequate comfort level or baseline comfort level  Description: Interventions:  - Encourage patient to monitor pain and request assistance  - Assess pain using appropriate pain scale  - Administer analgesics based on type and severity of pain and evaluate response  - Implement non-pharmacological measures as appropriate and evaluate response  - Consider cultural and social influences on pain and pain management  - Notify physician/advanced practitioner if interventions unsuccessful or patient reports new pain  Outcome: Progressing     Problem: SAFETY ADULT  Goal: Patient will remain free of falls  Description: INTERVENTIONS:  - Educate patient/family on patient safety including physical limitations  - Instruct patient to call for assistance with activity   - Consult OT/PT to assist with strengthening/mobility   - Keep Call bell within reach  - Keep bed low and locked with side rails adjusted as appropriate  - Keep care items and personal belongings within reach  - Initiate and maintain comfort rounds  - Make Fall Risk Sign visible to staff  - Offer Toileting every 2 Hours, in advance of need  - Initiate/Maintain bed/chair alarm  - Obtain necessary fall risk management equipment: non skid socks  - Apply yellow socks and bracelet for high fall risk patients  - Consider moving patient to room near nurses station  Outcome: Progressing  Goal: Maintain or return to baseline ADL function  Description: INTERVENTIONS:  -  Assess patient's ability to carry out ADLs; assess patient's baseline for ADL function and identify physical deficits which impact ability to perform ADLs (bathing, care of mouth/teeth, toileting, grooming, dressing, etc.)  - Assess/evaluate cause of self-care deficits   - Assess range of motion  - Assess patient's mobility; develop plan if impaired  - Assess patient's need for assistive devices and provide as appropriate  - Encourage maximum independence  but intervene and supervise when necessary  - Involve family in performance of ADLs  - Assess for home care needs following discharge   - Consider OT consult to assist with ADL evaluation and planning for discharge  - Provide patient education as appropriate  Outcome: Progressing  Goal: Maintains/Returns to pre admission functional level  Description: INTERVENTIONS:  - Perform AM-PAC 6 Click Basic Mobility/ Daily Activity assessment daily.  - Set and communicate daily mobility goal to care team and patient/family/caregiver.   - Collaborate with rehabilitation services on mobility goals if consulted  - Perform Range of Motion 3 times a day.  - Reposition patient every 2 hours.  - Dangle patient 3 times a day  - Stand patient 3 times a day  - Ambulate patient 3 times a day  - Out of bed to chair 3 times a day   - Out of bed for meals 3 times a day  - Out of bed for toileting  - Record patient progress and toleration of activity level   Outcome: Progressing     Problem: DISCHARGE PLANNING  Goal: Discharge to home or other facility with appropriate resources  Description: INTERVENTIONS:  - Identify barriers to discharge w/patient and caregiver  - Arrange for needed discharge resources and transportation as appropriate  - Identify discharge learning needs (meds, wound care, etc.)  - Arrange for interpretive services to assist at discharge as needed  - Refer to Case Management Department for coordinating discharge planning if the patient needs post-hospital services based on physician/advanced practitioner order or complex needs related to functional status, cognitive ability, or social support system  Outcome: Progressing     Problem: METABOLIC, FLUID AND ELECTROLYTES - ADULT  Goal: Electrolytes maintained within normal limits  Description: INTERVENTIONS:  - Monitor labs and assess patient for signs and symptoms of electrolyte imbalances  - Administer electrolyte replacement as ordered  - Monitor response to electrolyte  replacements, including repeat lab results as appropriate  - Instruct patient on fluid and nutrition as appropriate  Outcome: Progressing  Goal: Fluid balance maintained  Description: INTERVENTIONS:  - Monitor labs   - Monitor I/O and WT  - Instruct patient on fluid and nutrition as appropriate  - Assess for signs & symptoms of volume excess or deficit  Outcome: Progressing

## 2023-12-23 NOTE — ASSESSMENT & PLAN NOTE
Presents to the ER for evaluation of numbness and tingling to upper and lower extremities  Patient had fall two days ago and was seen in the ER for lower back pain  She reports onset of numbness and tingling today. Numbness and tingling upper extremities localized to fingers  CT head shows-No intracranial hemorrhage or calvarial fracture.   CT Cervical Spine shows-No cervical spine fracture or traumatic malalignment.   ER attending dicussed case with Neurology  Admit on observation  OT/PT eval  Check MRI cervical, thoracic and lumbar spine when available  Neurology consult  Supportive care

## 2023-12-23 NOTE — ED NOTES
Patient slowly ambulated to the bathroom. Steady gait, no assistive devices needed.      Brittny Watson  12/22/23 2003

## 2023-12-23 NOTE — PHYSICAL THERAPY NOTE
Physical Therapy Evaluation   Patient Name: Luh Chairez    Today's Date: 12/23/2023     Problem List  Principal Problem:    Numbness and tingling of right upper and lower extremity  Active Problems:    Acute low back pain    Smoker    Bacteriuria       Past Medical History  Past Medical History:   Diagnosis Date    COPD (chronic obstructive pulmonary disease) (HCC)         Past Surgical History  Past Surgical History:   Procedure Laterality Date    APPENDECTOMY      HERNIA REPAIR           12/23/23 1014   Note Type   Note type Evaluation   Pain Assessment   Pain Assessment Tool 0-10   Pain Score 6   Pain Location/Orientation Location: Back   Restrictions/Precautions   Weight Bearing Precautions Per Order No   Other Precautions Chair Alarm;Bed Alarm   Home Living   Type of Home House   Home Layout Multi-level   Bathroom Shower/Tub Tub/shower unit   Bathroom Toilet Standard   Bathroom Accessibility Accessible   Additional Comments No device at baseline   Prior Function   Level of [place] Independent with ADLs;Independent with functional mobility;Independent with IADLS   Lives With Spouse;Friend(s)   IADLs Family/Friend/Other provides transportation   Falls in the last 6 months 1 to 4   Vocational Workers comp   Comments Fall at work. Works at [employer]   Cognition   Overall Cognitive Status WFL   Arousal/Participation Alert   Attention Within functional limits   Orientation Level Oriented X4   Memory Within functional limits   Following Commands Follows all commands and directions without difficulty   Subjective   Subjective Pt is agreeable to PT   RLE Assessment   RLE Assessment WFL   LLE Assessment   LLE Assessment WFL   Bed Mobility   Rolling R 5  Supervision   Additional items Increased time required   Supine to Sit 5  Supervision   Additional items Bedrails;Increased time required   Transfers   Sit to Stand 5  Supervision   Additional items  Increased time required;Verbal cues   Stand to Sit 5  Supervision   Additional items Increased time required;Verbal cues   Toilet transfer 5  Supervision   Additional items Increased time required;Verbal cues   Additional Comments No AD   Ambulation/Elevation   Gait pattern Antalgic;Inconsistent blanca;Foward flexed;Short stride;Decreased hip extension;Decreased heel strike;Decreased toe off   Gait Assistance 5  Supervision   Assistive Device None   Distance 40 feet in room// 20 feet to bathroom   Balance   Static Sitting Good   Dynamic Sitting Good   Static Standing Fair   Dynamic Standing Fair   Ambulatory Fair   Activity Tolerance   Activity Tolerance Patient limited by fatigue   Assessment   Prognosis Fair   Problem List Decreased range of motion;Decreased endurance;Impaired balance;Decreased mobility;Decreased strength   Assessment Pt is 48 y.o. female seen for PT evaluation s/p admit to [facility] on 12/22/2023 w/ Numbness and tingling of right upper and lower extremity. PT consulted to assess pt's functional mobility and d/c needs. Order placed for PT eval and tx, w/ up and OOB as tolerated order. Comorbidities affecting pt's physical performance at time of assessment include: Numbness and tingling of right upper and lower extremity. PTA, pt was independent w/ all functional mobility w/ no AD  . Personal factors affecting pt at time of IE include: inability to ambulate household distances, inability to navigate community distances, inability to navigate level surfaces w/o external assistance, unable to perform dynamic tasks in community, positive fall history, unable to perform physical activity, limited insight into impairments, inability to perform IADLs, and inability to perform ADLs. Please find objective findings from PT assessment regarding body systems outlined above with impairments and limitations including weakness, gait deviations, pain, decreased activity tolerance, decreased functional  mobility tolerance, decreased safety awareness, and fall risk. Pt to benefit from continued PT tx to address deficits as defined above and maximize level of functional independent mobility and consistency. From PT/mobility standpoint, recommendation at time of d/c would be Level 3 minimum resource intensity, pending progress in order to facilitate return to PLOF.   Goals   Patient Goals to have less pain   LTG Expiration Date 01/06/24   Long Term Goal #1 Patient will be (I) with all transfers and bed mobility   Long Term Goal #2 Patient will ambulate 250' (I) to help faciltiate return to PLOF   Plan   Treatment/Interventions ADL retraining;Functional transfer training;LE strengthening/ROM;Elevations;Therapeutic exercise;Endurance training;Bed mobility;Gait training   PT Frequency 3-5x/wk   Discharge Recommendation   Rehab Resource Intensity Level, PT III (Minimum Resource Intensity)   AM-PAC Basic Mobility Inpatient   Turning in Flat Bed Without Bedrails 4   Lying on Back to Sitting on Edge of Flat Bed Without Bedrails 4   Moving Bed to Chair 4   Standing Up From Chair Using Arms 4   Walk in Room 4   Climb 3-5 Stairs With Railing 3   Basic Mobility Inpatient Raw Score 23   Basic Mobility Standardized Score 50.88   Highest Level Of Mobility   -HLM Goal 7: Walk 25 feet or more   JH-HLM Achieved 7: Walk 25 feet or more     Patient seated in chair at the end of the session, all lines intact,all needs within reach. Patients raw score on the [facility] Basic Mobility inpatient short form is 23, standardized score is 50.88, (greater than) 42.9. patient's at this level are likely to benefit from D/C to home however, please refer to therapist recommendation for safe D/C Plan. Pt benefited from co-evaluation of skilled OT and PT therapists in order to most appropriately address functional deficits d/t extensive assistance required for safe functional mobility, decreased activity tolerance, and regression from functioning level  prior to admission and/or onset of present illness. OT/PT objectives were addressed separately; please see OT note for specific goal areas targeted.

## 2023-12-23 NOTE — ASSESSMENT & PLAN NOTE
Tingling/numbness of her hands bilaterally and lower extremities involving the thigh anterior laterally bilaterally and ankle/foot area bilaterally.  Symptoms started around 2 days prior to admission.    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  Unmedicated.  No history of spine surgery  Past medical history is negative for myasthenia gravis/multiple sclerosis/alcohol use disorder      Plan:   -Start IV Solu-Medrol 40 mg daily  -PT OT  -Check vitamin B12 and B9  -Neurology team consulted; appreciate recommendations  -Continue to follow-up.  -Neurochecks

## 2023-12-23 NOTE — UTILIZATION REVIEW
Initial Clinical Review    Admission: Date/Time/Statement:       OBSERVATION 12-22-23 CHANGED TO INPATIENT 12-23-23 FOR PAIN MANAGEMENT, NEURO ASSESSMENTS AND MRI l SPINE        Admission Orders (From admission, onward)       Ordered        12/23/23 1410  Inpatient Admission  Once            12/22/23 2044  Place in Observation  Once                             ED Arrival Information       Expected   -    Arrival   12/22/2023 13:01    Acuity   Urgent              Means of arrival   Wheelchair    Escorted by   Spouse    Service   Hospitalist    Admission type   Emergency              Arrival complaint   Numbness Of Extremities             Chief Complaint   Patient presents with    Numbness     Patient reports pressure in the middle of her back and beginning this morning she developed numbness in b/l arms/hands. Back injury two days ago while at work       Initial Presentation: 48 y.o. female presents to ed from home for evaluation and treatment of  back pain / pressure and numbness in her bilateral arms and hands. She had a back injury at work 2 days ago. Clinical evaluation significant for paraspinal tenderness in lumbar sacral area. Imaging shows moderate R foraminal disc protrusion at L4-L5 with moderate stenosis but no compressive neuropathy.  UA : moderate bacteria and wbc 4-10, leukocytes.  Initially treated with iv zofran x1, iv mso4 x1   Admit to observation for paresthesias. Plan includes MRI C, T ,L spine, neurology consult, PT/OT evaluations, analgesia.     Date: 12- 23 -23   Day 2: observation changed to inpatient   Myalgias of unclear etiology.  Start iv steroids and evaluate for improvement. PT/OT evaluations completed with minimal rehab needs identified. Neurology consult completed: mild weakness and subjective sensory changes in LLE after fall.  Recommend check CK, try scheduled robaxin tid, iv to po prednisone taper x 5 days. Also consider scheduled toradol. Iv ceftriaxone for UTI.      Date  12-24-23  inpatient med surg   Continue iv ceftriaxone for UTI.  Back pain persists 5/10. MRI L spine pending. Treated with lidoderm patch, po prednisone 60 mg daily, robaxin q8 hr.          ED Triage Vitals [12/22/23 1318]   98.5 °F (36.9 °C) 74 18 135/65 98 %      Temporal Monitor         Pain Score       9          12/23/23 80.9 kg (178 lb 5.6 oz)     Additional Vital Signs:     Date/Time Temp Pulse Resp BP MAP (mmHg) SpO2 O2 Device   12/23/23 0900 -- -- -- -- -- -- None (Room air)   12/22/23 2255 98.1 °F (36.7 °C) 80 18 110/71 -- -- --   12/22/23 21:16:26 98.1 °F (36.7 °C) 80 18 110/71 84 96 % --   12/22/23 1945 -- 68 16 127/64 89 96 % None (Room air)   12/22/23 1713 98.4 °F (36.9 °C) 69 16 102/52 73 95 % None (Room air)   12/22/23 1622 -- 72 -- 127/60 -- 98 % None (Room air)   12/22/23 1318 98.5 °F (36.9 °C) 74 18 135/65 -- 98 % None (Room air)           Pertinent Labs/Diagnostic Test Results:     CT head without contrast   Final  (12/22 1911)      No intracranial hemorrhage or calvarial fracture.         CT chest abdomen pelvis w contrast   Final  (12/22 1706)      1. No acute thoracic, abdominal or pelvic injury. Specifically, no spinal fractures.      2. Moderate right foraminal disc protrusion suggested at L4-5 with associated moderate ipsilateral foraminal stenosis but no discernible compressive neuropathy.      3. Minimally prominent vascular confluence versus 3 mm nodule left lower pulmonary lobe (series 3/91). Based on current Fleischner Society 2017 Guidelines on incidental pulmonary nodule, optional follow-up CT at 12 months can be considered.         CT cervical spine without contrast   Final  (12/22 1640)      No cervical spine fracture or traumatic malalignment.        Results from last 7 days   Lab Units 12/22/23  1457   SARS-COV-2  Negative     Results from last 7 days   Lab Units 12/23/23  0433 12/22/23  1457   WBC Thousand/uL 4.76 8.69   HEMOGLOBIN g/dL 13.2 13.8   HEMATOCRIT % 40.4 42.5    PLATELETS Thousands/uL 190 211   NEUTROS ABS Thousands/µL 2.87 5.93         Results from last 7 days   Lab Units 12/23/23  0433 12/22/23  1457   SODIUM mmol/L 136 137   POTASSIUM mmol/L 3.7 3.9   CHLORIDE mmol/L 105 105   CO2 mmol/L 22 23   ANION GAP mmol/L 9 9   BUN mg/dL 17 18   CREATININE mg/dL 0.77 0.75   EGFR ml/min/1.73sq m 90 93   CALCIUM mg/dL 8.6 8.8   MAGNESIUM mg/dL 1.9  --    PHOSPHORUS mg/dL 4.3  --      Results from last 7 days   Lab Units 12/23/23  0433 12/22/23  1457   AST U/L 12* 12*   ALT U/L 16 18   ALK PHOS U/L 38 43   TOTAL PROTEIN g/dL 5.9* 6.1*   ALBUMIN g/dL 3.9 4.2   TOTAL BILIRUBIN mg/dL 0.35 0.35   BILIRUBIN DIRECT mg/dL  --  0.06         Results from last 7 days   Lab Units 12/23/23  0433 12/22/23  1457   GLUCOSE RANDOM mg/dL 102 85           Results from last 7 days   Lab Units 12/22/23  1457   CK TOTAL U/L 123     Results from last 7 days   Lab Units 12/22/23  1711 12/22/23  1457   HS TNI 0HR ng/L  --  <2   HS TNI 2HR ng/L <2  --          Results from last 7 days   Lab Units 12/22/23  1457   PROTIME seconds 13.6   INR  1.05   PTT seconds 26         Results from last 7 days   Lab Units 12/23/23  0433   PROCALCITONIN ng/ml <0.05     Results from last 7 days   Lab Units 12/22/23  1618   LACTIC ACID mmol/L 0.6           Results from last 7 days   Lab Units 12/22/23  1457   LIPASE u/L 30     Results from last 7 days   Lab Units 12/22/23  1457   CLARITY UA  Slightly Cloudy   COLOR UA  Light Yellow   SPEC GRAV UA  <=1.005   PH UA  6.0   GLUCOSE UA mg/dl Negative   KETONES UA mg/dl Negative   BLOOD UA  Negative   PROTEIN UA mg/dl Negative   NITRITE UA  Negative   BILIRUBIN UA  Negative   UROBILINOGEN UA E.U./dl 0.2   LEUKOCYTES UA  Trace*   WBC UA /hpf 4-10*   RBC UA /hpf 1-2   BACTERIA UA /hpf Moderate*   EPITHELIAL CELLS WET PREP /hpf Moderate*     Results from last 7 days   Lab Units 12/22/23  7199   INFLUENZA A PCR  Negative   INFLUENZA B PCR  Negative   RSV PCR  Negative         ED  Treatment:   Medication Administration from 12/22/2023 1300 to 12/22/2023 2110         Date/Time Order Dose Route Action     12/22/2023 1501 EST ondansetron (ZOFRAN) injection 4 mg 4 mg Intravenous Given     12/22/2023 1501 EST morphine injection 4 mg 4 mg Intravenous Given     12/22/2023 1540 EST fentanyl citrate (PF) 100 MCG/2ML 25 mcg 25 mcg Intravenous Not Given          Past Medical History:   Diagnosis Date    COPD (chronic obstructive pulmonary disease) (HCC)      Present on Admission:   Numbness and tingling of right upper and lower extremity   Acute low back pain   Smoker   Bacteriuria      Admitting Diagnosis:     Low back pain [M54.50]  Paresthesias [R20.2]  Incidental pulmonary nodule [R91.1]  Fall on same level [W18.30XA]  Leg weakness, bilateral [R29.898]  Extremity numbness [R20.0]    Age/Sex: 48 y.o. female    Scheduled Medications:    cefTRIAXone, 1,000 mg, Intravenous, Q24H  heparin (porcine), 5,000 Units, Subcutaneous, Q8H ADRIANNA  lidocaine, 1 patch, Topical, Daily  nicotine, 1 patch, Transdermal, Daily      Continuous IV Infusions:     PRN Meds:  methocarbamol, 500 mg, Oral, HS PRN  morphine injection, 2 mg, Intravenous, Q6H PRN  ondansetron, 4 mg, Intravenous, Q6H PRN  traMADol, 50 mg, Oral, Q6H PRN        IP CONSULT TO NEUROLOGY    Network Utilization Review Department  ATTENTION: Please call with any questions or concerns to [phone] and carefully listen to the prompts so that you are directed to the right person. All voicemails are confidential.   For Discharge needs, contact Care Management DC Support Team at [phone] opt. [phone]  Send all requests for admission clinical reviews, approved or denied determinations and any other requests to dedicated fax number below belonging to the campus where the patient is receiving treatment. List of dedicated fax numbers for the Facilities:  FACILITY NAME UR FAX NUMBER   ADMISSION DENIALS (Administrative/Medical Necessity) [phone]   DISCHARGE  SUPPORT TEAM (NETWORK) [phone]   PARENT CHILD HEALTH (Maternity/NICU/Pediatrics) [phone]   [facility] [phone]   [facility] [phone]   [facility] [phone]   [facility] [phone]   [facility] [phone]   [facility] [phone]   General acute hospital [phone]   [facility] [phone]   [facility] [phone]   [facility] [phone]   [facility] [phone]

## 2023-12-23 NOTE — H&P
[facility]  H&P  Name: Luh Chairez 48 y.o. female I MRN: [id number]  Unit/Bed#: 419-01 I Date of Admission: 12/22/2023   Date of Service: 12/22/2023 I Hospital Day: 0      Assessment/Plan   * Numbness and tingling of right upper and lower extremity  Assessment & Plan  Presents to the ER for evaluation of numbness and tingling to upper and lower extremities  Patient had fall two days ago and was seen in the ER for lower back pain  She reports onset of numbness and tingling today. Numbness and tingling upper extremities localized to fingers  CT head shows-No intracranial hemorrhage or calvarial fracture.   CT Cervical Spine shows-No cervical spine fracture or traumatic malalignment.   ER attending dicussed case with Neurology  Admit on observation  OT/PT eval  Check MRI cervical, thoracic and lumbar spine when available  Neurology consult  Supportive care    Acute low back pain  Assessment & Plan  Patient complains of lower back pan.  Patient had fall 2 days ago. Seen in the ER. Imaging studies negative at that time. Dischargeed with pain medication and muscle relaxer.  CT scan shows-Moderate right foraminal disc protrusion suggested at L4-5 with associated moderate ipsilateral foraminal stenosis but no discernible compressive neuropathy.   Received pain medication in the ER with little relief  Continue pain medication PRN  MRI as recommended by Neurology  OT/PT eval  Supportive care     Bacteriuria  Assessment & Plan  UA shows-trace leukocytes, 4-10 WBCs, moderate bacteria  Patient does admit to urinary frequency  Will initiate IV ceftriaxone   Follow cultures   Check Procalcitonin  Monitor urinary output      Smoker  Assessment & Plan  Patient reports smoking 2 packs daily  Smoking cessation education  Nicotine patch           VTE Pharmacologic Prophylaxis:   Moderate Risk (Score 3-4) - Pharmacological DVT Prophylaxis Ordered: heparin.  Code Status: Level 1 - Full Code Level 1 full  code   Discussion with family: Patient declined call to .     Anticipated Length of Stay: Patient will be admitted on an observation basis with an anticipated length of stay of less than 2 midnights secondary to bilateral upper and lower extremity paresthesia acute lower back pain, bacteriuria..    Total Time Spent on Date of Encounter in care of patient: 40 mins. This time was spent on one or more of the following: performing physical exam; counseling and coordination of care; obtaining or reviewing history; documenting in the medical record; reviewing/ordering tests, medications or procedures; communicating with other healthcare professionals and discussing with patient's family/caregivers.    Chief Complaint: Numbness and tingling to extremities    History of Present Illness:  Luh Chairez is a 48 y.o. female with a PMH of hyperlipidemia, smoker who presents to the emergency room for complaints of numbness and tingling in her upper and lower extremities and worsening lower back pain.  Patient reports that she had a fall on 12/20/2023 and was seen in the emergency room and subsequently discharged to no acute findings on imaging.  Patient was discharged with muscle relaxer and pain medication.  Patient reports that she new-onset of numbness and tingling in her upper and lower extremities with worsening back pain.  She states that the numbness and tingling in her upper extremities is now localized to her fingers only.  Patient denies episode of incontinence,  having any headaches, neck pain, dizziness, weakness, chest pain, nausea, vomiting, diarrhea, abdominal pain, urinary symptoms.  Patient describes pain in her lower back is severe pressure which is also felt in her buttocks.       Work up in the ER included labs that were essentially within normal limits.  COVID-19/flu/RSV negative.  UA shows trace leukocytes, 4-10 WBCs, moderate bacteria.  While in the emergency room patient treated with  morphine 4 mg IV Zofran 4 mg IV.  ER attending discussed case with neurology recommendations to admit for MRI of cervical thoracic and lumbar spine with neuro consult in the a.m.    Patient is being admitted on observation status [facility] for further workup and management of numbness and tingling to upper and lower extremities, acute lower back pain, bacteriuria.    Review of Systems:  Review of Systems   Constitutional:  Positive for activity change. Negative for appetite change, diaphoresis, fatigue and fever.   Eyes:  Negative for photophobia and visual disturbance.   Respiratory:  Negative for cough, chest tightness, shortness of breath and wheezing.    Cardiovascular:  Negative for chest pain, palpitations and leg swelling.   Gastrointestinal:  Negative for abdominal pain, diarrhea, nausea and vomiting.   Genitourinary:  Positive for frequency. Negative for difficulty urinating, dysuria and flank pain.   Musculoskeletal:  Positive for back pain. Negative for arthralgias, myalgias, neck pain and neck stiffness.   Skin:  Negative for rash and wound.   Neurological:  Positive for numbness. Negative for dizziness, tremors, syncope, facial asymmetry, weakness, light-headedness and headaches.   Psychiatric/Behavioral:  Negative for agitation, confusion and sleep disturbance. The patient is not nervous/anxious.        Past Medical and Surgical History:   Past Medical History:   Diagnosis Date    COPD (chronic obstructive pulmonary disease) (HCC)        Past Surgical History:   Procedure Laterality Date    APPENDECTOMY      HERNIA REPAIR         Meds/Allergies:  Prior to Admission medications    Medication Sig Start Date End Date Taking? Authorizing Provider   lidocaine (Lidoderm) 5 % Apply 1 patch topically over 12 hours daily Remove & Discard patch within 12 hours or as directed by MD 12/21/23   Willie Mcdaniel, DO   methocarbamol (ROBAXIN) 500 mg tablet Take 2 tablets (1,000 mg total) by mouth daily at  "bedtime as needed for muscle spasms 12/21/23   Willie Mcdaniel,    naproxen (Naprosyn) 500 mg tablet Take 1 tablet (500 mg total) by mouth 2 (two) times a day with meals 12/21/23   Willie Mcdaniel DO     I have reviewed home medications with patient personally.    Allergies:   Allergies   Allergen Reactions    Hydrocodone-Acetaminophen Hives    Oxycodone-Acetaminophen GI Intolerance    Food Rash     Blue berries    Vaccinium Angustifolium Hives and Rash       Social History:  Marital Status: /Civil Union   Occupation: Healthcare worker  Patient Pre-hospital Living Situation: Home  Patient Pre-hospital Level of Mobility: walks  Patient Pre-hospital Diet Restrictions: none reported   Substance Use History:   Social History     Substance and Sexual Activity   Alcohol Use Not Currently    Comment: social drinker     Social History     Tobacco Use   Smoking Status Every Day    Current packs/day: 2.00    Types: Cigarettes   Smokeless Tobacco Never     Social History     Substance and Sexual Activity   Drug Use Never       Family History:  History reviewed. No pertinent family history.    Physical Exam:     Vitals:   Blood Pressure: 110/71 (12/22/23 2116)  Pulse: 80 (12/22/23 2116)  Temperature: 98.1 °F (36.7 °C) (12/22/23 2116)  Temp Source: Temporal (12/22/23 1713)  Respirations: 18 (12/22/23 2116)  Height: 5' 4\" (162.6 cm) (12/22/23 1318)  Weight - Scale: 81.6 kg (180 lb) (12/22/23 1318)  SpO2: 96 % (12/22/23 2116)    Physical Exam  Constitutional:       General: She is not in acute distress.     Appearance: She is not ill-appearing.   HENT:      Head: Normocephalic and atraumatic.      Nose: No congestion or rhinorrhea.      Mouth/Throat:      Mouth: Mucous membranes are moist.      Pharynx: Oropharynx is clear. No oropharyngeal exudate or posterior oropharyngeal erythema.   Eyes:      General:         Right eye: No discharge.         Left eye: No discharge.      Pupils: Pupils are equal, round, and " reactive to light.   Cardiovascular:      Rate and Rhythm: Normal rate and regular rhythm.      Pulses: Normal pulses.   Pulmonary:      Effort: No respiratory distress.   Abdominal:      General: There is no distension.      Tenderness: There is no abdominal tenderness.   Musculoskeletal:         General: No swelling or tenderness.      Cervical back: Normal range of motion and neck supple. No rigidity or tenderness.      Right lower leg: No edema.      Left lower leg: No edema.      Comments: Mild paraspinal, midline tenderness in lumbar and  sacral region   Skin:     General: Skin is warm and dry.      Capillary Refill: Capillary refill takes less than 2 seconds.      Coloration: Skin is not jaundiced or pale.   Neurological:      Mental Status: She is alert and oriented to person, place, and time.   Psychiatric:         Mood and Affect: Mood normal.          Additional Data:     Lab Results:  Results from last 7 days   Lab Units 12/22/23  1457   WBC Thousand/uL 8.69   HEMOGLOBIN g/dL 13.8   HEMATOCRIT % 42.5   PLATELETS Thousands/uL 211   NEUTROS PCT % 69   LYMPHS PCT % 25   MONOS PCT % 5   EOS PCT % 1     Results from last 7 days   Lab Units 12/22/23  1457   SODIUM mmol/L 137   POTASSIUM mmol/L 3.9   CHLORIDE mmol/L 105   CO2 mmol/L 23   BUN mg/dL 18   CREATININE mg/dL 0.75   ANION GAP mmol/L 9   CALCIUM mg/dL 8.8   ALBUMIN g/dL 4.2   TOTAL BILIRUBIN mg/dL 0.35   ALK PHOS U/L 43   ALT U/L 18   AST U/L 12*   GLUCOSE RANDOM mg/dL 85     Results from last 7 days   Lab Units 12/22/23  1457   INR  1.05             Results from last 7 days   Lab Units 12/22/23  1618   LACTIC ACID mmol/L 0.6       Lines/Drains:  Invasive Devices       Peripheral Intravenous Line  Duration             Peripheral IV 12/22/23 Left Antecubital <1 day                        Imaging: Reviewed radiology reports from this admission including: chest CT scan, abdominal/pelvic CT, CT head, and CT cervical spine   CT head without contrast   Final  Result by Akil Bolanos MD (12/22 1911)      No intracranial hemorrhage or calvarial fracture.                  Workstation performed: [id number]         CT chest abdomen pelvis w contrast   Final Result by Chet Myers DO (12/22 1706)      1. No acute thoracic, abdominal or pelvic injury. Specifically, no spinal fractures.      2. Moderate right foraminal disc protrusion suggested at L4-5 with associated moderate ipsilateral foraminal stenosis but no discernible compressive neuropathy.      3. Minimally prominent vascular confluence versus 3 mm nodule left lower pulmonary lobe (series 3/91). Based on current Fleischner Society 2017 Guidelines on incidental pulmonary nodule, optional follow-up CT at 12 months can be considered.         Workstation performed: [id number]         CT cervical spine without contrast   Final Result by Akil Bolanos MD (12/22 1640)      No cervical spine fracture or traumatic malalignment.                  Workstation performed: [id number]             EKG and Other Studies Reviewed on Admission:   EKG: NSR. HR 72 bpm.    ** Please Note: This note has been constructed using a voice recognition system. **

## 2023-12-23 NOTE — PLAN OF CARE
Problem: OCCUPATIONAL THERAPY ADULT  Goal: Performs self-care activities at highest level of function for planned discharge setting.  See evaluation for individualized goals.  Description: Treatment Interventions: ADL retraining, Functional transfer training, UE strengthening/ROM, Endurance training, Patient/family training, Equipment evaluation/education, Compensatory technique education, Activityengagement          See flowsheet documentation for full assessment, interventions and recommendations.   Note: Limitation: Decreased ADL status, Decreased UE strength, Decreased Safe judgement during ADL, Decreased endurance, Decreased self-care trans, Decreased high-level ADLs, Decreased sensation     Assessment: Pt is a 48 y.o. female seen for OT evaluation s/p admit to [facility] on 12/22/2023 w/ Numbness and tingling of right upper and lower extremity.  Comorbidities affecting pt's functional performance at time of assessment include:  numbness and tingling of R UE and R LE, acute low back pain, smoker, bacteriuria . Personal factors affecting pt at time of IE include:steps to enter environment, difficulty performing ADLS, difficulty performing IADLS , and health management . Prior to admission, pt was (I) with ADLs and IADLs with use of no device during mobility. Upon evaluation: Pt requires (S) level with use of no device during mobility 2* the following deficits impacting occupational performance: weakness, decreased strength, decreased balance, decreased tolerance, impaired sensation, decreased safety awareness, and increased pain. Pt to benefit from continued skilled OT tx while in the hospital to address deficits as defined above and maximize level of functional independence w ADL's and functional mobility. Occupational Performance areas to address include: grooming, bathing/shower, toilet hygiene, dressing, functional mobility, community mobility, and clothing management. The patient's raw score on the AM-PAC Daily  Activity Inpatient Short Form is 22. A raw score of greater than or equal to 19 suggests the patient may benefit from discharge to home. Discharge recommendation at this time is level III minimum resource intensity.  Pt benefited from co-evaluation of skilled OT and PT therapists in order to most appropriately address functional deficits d/t extensive assistance required for safe functional mobility, decreased activity tolerance, and regression from functioning level prior to admission and/or onset of present illness. OT/PT objectives were addressed separately; please see PT note for specific goal areas targeted.     Rehab Resource Intensity Level, OT: III (Minimum Resource Intensity)

## 2023-12-23 NOTE — CASE MANAGEMENT
Case Management Progress Note    Patient name Luh Chairez  Location /419-01 MRN [id number]  : 1975 Date 2023       LOS (days): 0  Geometric Mean LOS (GMLOS) (days):   Days to GMLOS:        OBJECTIVE:        Current admission status: Inpatient  Preferred Pharmacy:   [id number] [place] [address]  [address] [address]  Phone: [phone] Fax: [phone]    Primary Care Provider: REMI Murry    Primary Insurance: WORKERS COMPENSATION  Secondary Insurance:     PROGRESS NOTE:      Patient has MRI scheduled at [facility] tomorrow  23 at 1430.    Request for transport submitted to Round trip for round trip confirmed  at [place] at 1345 by Rajeev PAGAN  and  at [place] [address]. To return to [facility].    Medical Necessity for transportation was completed. Copy available for transport team along with face sheet.    Provider and nursing aware of same.

## 2023-12-23 NOTE — PLAN OF CARE
Problem: PHYSICAL THERAPY ADULT  Goal: Performs mobility at highest level of function for planned discharge setting.  See evaluation for individualized goals.  Description: Treatment/Interventions: ADL retraining, Functional transfer training, LE strengthening/ROM, Elevations, Therapeutic exercise, Endurance training, Bed mobility, Gait training          See flowsheet documentation for full assessment, interventions and recommendations.  Note: Prognosis: Fair  Problem List: Decreased range of motion, Decreased endurance, Impaired balance, Decreased mobility, Decreased strength  Assessment: Pt is 48 y.o. female seen for PT evaluation s/p [facility] 12/22/2023 w/ Numbness and tingling of right upper and lower extremity. PT consulted to assess pt's functional mobility and d/c needs. Order placed for PT eval and tx, w/ up and OOB as tolerated order. Comorbidities affecting pt's physical performance at time of assessment include: Numbness and tingling of right upper and lower extremity. PTA, pt was independent w/ all functional mobility w/ no AD  . Personal factors affecting pt at time of IE include: inability to ambulate household distances, inability to navigate community distances, inability to navigate level surfaces w/o external assistance, unable to perform dynamic tasks in community, positive fall history, unable to perform physical activity, limited insight into impairments, inability to perform IADLs, and inability to perform ADLs. Please find objective findings from PT assessment regarding body systems outlined above with impairments and limitations including weakness, gait deviations, pain, decreased activity tolerance, decreased functional mobility tolerance, decreased safety awareness, and fall risk. Pt to benefit from continued PT tx to address deficits as defined above and maximize level of functional independent mobility and consistency. From PT/mobility standpoint, recommendation at  time of d/c would be Level 3 minimum resource intensity, pending progress in order to facilitate return to PLOF.        Rehab Resource Intensity Level, PT: III (Minimum Resource Intensity)    See flowsheet documentation for full assessment.

## 2023-12-23 NOTE — ASSESSMENT & PLAN NOTE
Patient complains of lower back pan.  Patient had fall 2 days ago. Seen in the ER. Imaging studies negative at that time. Dischargeed with pain medication and muscle relaxer.  CT scan shows-Moderate right foraminal disc protrusion suggested at L4-5 with associated moderate ipsilateral foraminal stenosis but no discernible compressive neuropathy.   Received pain medication in the ER with little relief  Continue pain medication PRN  MRI as recommended by Neurology  OT/PT eval  Supportive care

## 2023-12-23 NOTE — ASSESSMENT & PLAN NOTE
Patient complains of lower back pan.  Secondary to fall 2 days prior to admission.    CT scan shows-Moderate right foraminal disc protrusion suggested at L4-5 with associated moderate ipsilateral foraminal stenosis but no discernible compressive neuropathy.       Start 40 mg IV Solu-Medrol OD  Continue pain medication PRN  MRI as recommended by Neurology; pending further recommendations   OT/PT eval  Supportive care

## 2023-12-23 NOTE — ASSESSMENT & PLAN NOTE
UA shows-trace leukocytes, 4-10 WBCs, moderate bacteria  Patient does admit to urinary frequency  Will initiate IV ceftriaxone   Follow cultures   Check Procalcitonin  Monitor urinary output

## 2023-12-23 NOTE — ASSESSMENT & PLAN NOTE
UA shows-trace leukocytes, 4-10 WBCs, moderate bacteria  Patient does admit to urinary frequency  Will initiate IV ceftriaxone  complete 3 days  Follow cultures

## 2023-12-23 NOTE — CONSULTS
TeleConsultation - Neurology   Luh Chairez 48 y.o. female MRN: [id number]  Unit/Bed#: 419-01 Encounter: [id number]        REQUIRED DOCUMENTATION:     1. This service was provided via Telemedicine.  2. Provider located at [facility].  3. TeleMed provider: Natali Ritchie DO.  4. Identify all parties in room with patient during tele consult:  Patient  5.Patient was then informed that this was a Telemedicine visit and that the exam was being conducted confidentially over secure lines. My office door was closed. No one else was in the room.  Patient acknowledged consent and understanding of privacy and security of the Telemedicine visit, and gave us permission to have the assistant stay in the room in order to assist with the history and to conduct the exam.  I informed the patient that I have reviewed their record in Epic and presented the opportunity for them to ask any questions regarding the visit today.  The patient agreed to participate.             Assessment/Plan     Ms. Luh Chairez is a pleasant 49 yo female seen in consultation for low back pain and suspect pain limited mild weakness and subjective greater than objective sensory changes/ reports dysesthesias in LE after fall.  - CT H/ C spine/ CAP showing no fractures . L spine herniated disc with protrusion to the right noted L4-5  - Suspect lumbar radiculitis from irritation from fall however do recommend MRI L spine without contrast given significant pressure reported with standing/ walking to make sure no other acute injury not visualized on CT.  - Can treat with prednisone 60 mg x 5 days and slow taper if no other C/I from medical standpoint. I am told she has asymptomatic bacteriuria and not actual UTI as she does not endorse UTI symptoms. Scheduled Toradol is another option with close renal function monitoring.  - Check CK  - Can try robaxin 500 TID in patient at least as long as no significant sedation.  - Avoid narcotics  "unless absolutely needed for severe pain refractory to conservative measures  - PT/OT assessment appreciated  - Check HgbA1C , TSH, B12, folate if not recently checked    Will follow up MRI L spine.      Recommendations for outpatient neurological follow up have yet to be determined.    History of Present Illness     Reason for Consult / Principal Problem: lower extremity pain and  after fall  Hx and PE limited by: tele consult, pain limited exam  HPI: Luh Chairez is a 48 y.o.  female with COPD- tobacco use, left arm/ leg numbness tingling about a decade ago attributed to bladder stimulator as she reports after this was removed symptoms improved, hx LLE meralgia paresthetica, who presents with fall on black ice 12/20/23,   Patient came in initially 12/21/23 to ED then was discharged home from ED.  She came back again 12/22/23 with persistent / worsening symptoms. 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 States her boss noted she was in pain with walking and asked to go and get \"check out\".    Patient reports she fell forward and broke her fall landing on her hands. Denies hitting head neck or low back however repots severe low back pain, right worse than left at times radiating to buttocks and causing muscle spasms.  She states she feels intense pressure in mid back with standing which limits her walking  PT did evaluate her here and noted fatigue with walking  Pt reports bl LE weakness but reports pain also, does report numbness/tingling in hands but no weakness.  No new bladder or bowel control issues  She denies saddle anesthesia  Denies prior hx similar  CT H no contrast not acute  CT C spine not acute  CT CAP with no fractures however does have moderate right disc protrusion L4-5. She states she was unaware of this prior      Inpatient consult to Neurology  Consult performed by: Natali Ritchie DO  Consult ordered by: Laurent Castillo PA-C           Review of " "Systems 10 point ROS completed and negative other than that noted above    Historical Information   Past Medical History:   Diagnosis Date    COPD (chronic obstructive pulmonary disease) (HCC)      Past Surgical History:   Procedure Laterality Date    APPENDECTOMY      HERNIA REPAIR       Social History   Social History     Substance and Sexual Activity   Alcohol Use Not Currently    Comment: social drinker     Social History     Substance and Sexual Activity   Drug Use Never     E-Cigarette/Vaping     E-Cigarette/Vaping Substances     Social History     Tobacco Use   Smoking Status Every Day    Current packs/day: 2.00    Types: Cigarettes   Smokeless Tobacco Never     Family History: History reviewed. No pertinent family history.    Review of previous medical records was  completed.     Meds/Allergies   all current active meds have been reviewed    Allergies   Allergen Reactions    Hydrocodone-Acetaminophen Hives    Oxycodone-Acetaminophen GI Intolerance    Food Rash     Blue berries    Vaccinium Angustifolium Hives and Rash       Objective   Vitals:Blood pressure 110/71, pulse 80, temperature 98.1 °F (36.7 °C), temperature source Oral, resp. rate 18, height 5' 4\" (1.626 m), weight 80.9 kg (178 lb 5.6 oz), SpO2 96%.,Body mass index is 30.61 kg/m².    Intake/Output Summary (Last 24 hours) at 12/23/2023 1327  Last data filed at 12/23/2023 1242  Gross per 24 hour   Intake 960 ml   Output 790 ml   Net 170 ml       Invasive Devices:   Invasive Devices       Peripheral Intravenous Line  Duration             Peripheral IV 12/22/23 Left Antecubital <1 day                    Physical Exam  Neurologic Exam    Modified PE as this is a video consultation:  Gen:   NAD.  HEENT:  No Septal deviation EOMI NCAT.  Resp:  Symmetric chest rise and patient in no obvious respiratory distress  MSK: + mid low back pain and buttock pain right worse than left, limited hip flexion cannot raise > 5 seconds due to pain  Skin: No rash noted in " visualized portion of this exam    Neuroexam:  AO X 4. No aphasia or dysarthria confusion noted.  Patient is able to follow 2 and 3 step commands with ease  CN 2-12 grossly intact   Motor:  Intact antigravity X 4 extremities. No Pronator drift noted in bl UE, bl LE drifts closer to end of 5 seconds due to pain however maintains antigravity. Intact dorsi and plantar flexion at feet  Sensation: Intact to light touch,   Cerebellar: No dysmetria b/l with FNF testing.Cannot perform HS testing due to pain  Gait:  deferred  Cannot check reflexes as tele exam    Lab Results: I have personally reviewed pertinent reports.    Imaging Studies: I have personally reviewed pertinent reports.    EKG, Pathology, and Other Studies: I have personally reviewed pertinent reports.        Code Status: Level 1 - Full Code  Advance Directive and Living Will:      Power of :    POLST:      Counseling / Coordination of Care  Total time spent today 55 minutes. Greater than 50% of total time was spent with the patient and / or family counseling and / or coordination of care. A description of the counseling / coordination of care: as noted above in A/P including discussing with primary team

## 2023-12-23 NOTE — PROGRESS NOTES
[facility]  Progress Note  Name: Luh Chairez I  MRN: [id number]  Unit/Bed#: 419-01 I Date of Admission: 12/22/2023   Date of Service: 12/23/2023 I Hospital Day: 0    Assessment/Plan   Bacteriuria  Assessment & Plan  UA shows-trace leukocytes, 4-10 WBCs, moderate bacteria  Patient does admit to urinary frequency  Will initiate IV ceftriaxone  complete 3 days  Follow cultures         Smoker  Assessment & Plan  Patient reports smoking 2 packs daily  Smoking cessation education  Nicotine patch    Acute low back pain  Assessment & Plan  Patient complains of lower back pan.  Secondary to fall 2 days prior to admission.    CT scan shows-Moderate right foraminal disc protrusion suggested at L4-5 with associated moderate ipsilateral foraminal stenosis but no discernible compressive neuropathy.       Start 40 mg IV Solu-Medrol OD  Continue pain medication PRN  MRI as recommended by Neurology; pending further recommendations   OT/PT eval  Supportive care     * Numbness and tingling of right upper and lower extremity  Assessment & Plan  Tingling/numbness of her hands bilaterally and lower extremities involving the thigh anterior laterally bilaterally and ankle/foot area bilaterally.  Symptoms started around 2 days prior to admission.    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  Unmedicated.  No history of spine surgery  Past medical history is negative for myasthenia gravis/multiple sclerosis/alcohol use disorder      Plan:   -Start IV Solu-Medrol 40 mg daily  -PT OT  -Check vitamin B12 and B9  -Neurology team consulted; appreciate recommendations  -Continue to follow-up.  -Neurochecks             VTE Pharmacologic Prophylaxis:   Pharmacologic: Heparin  Mechanical VTE Prophylaxis in Place: Yes    Patient Centered Rounds: I have performed bedside rounds with nursing staff today.    Discussions with Specialists or Other Care Team  Provider: neurology     Education and Discussions with Family / Patient: patient     Time Spent for Care: 30 minutes.  More than 50% of total time spent on counseling and coordination of care as described above.    Current Length of Stay: 0 day(s)    Current Patient Status: Observation   Certification Statement: The patient will continue to require additional inpatient hospital stay due to acute low back pain, numbness/tinlging lower and upper extremity     Discharge Plan: when stable     Code Status: Level 1 - Full Code      Subjective:   Patient was seen today at bedside.  Continues to complain of hand tingling/numbness bilaterally and lower extremity tingling/numbness involving the anterior lateral sides of her thighs bilaterally and ankle foot area bilaterally sparing the distal lower extremities.  She also is complaining of lower back pain that is midline, sharp in character, moderate in severity, aggravated with ambulation and partially elevated with rest and nonweightbearing and medications.Symptoms started around 2 to 3 days ago.  S/p fall at work.     Today she reports improvement in pain however tingling/numbness is the same compared to the time of presentation to the hospital.    No bowel or bladder incontinence.  No weakness.  PT/OT at bedside patient is ambulating independently.  Tolerating oral diet.    Of note; past medical history is remarkable for meralgia paresthetica.  Patient reports that she developed similar problem around 10 years ago and was hospitalized and was given diagnosis of meralgia paresthetica after extensive workup and was discharged on a medication that she did not take since then.  She did have recurrence of similar problem around a year or 2 ago which was treated conservatively with medications as well.        Objective:     Vitals:   Temp (24hrs), Av.3 °F (36.8 °C), Min:98.1 °F (36.7 °C), Max:98.5 °F (36.9 °C)    Temp:  [98.1 °F (36.7 °C)-98.5 °F (36.9 °C)] 98.1 °F (36.7  °C)  HR:  [68-80] 80  Resp:  [16-18] 18  BP: (102-135)/(52-71) 110/71  SpO2:  [95 %-98 %] 96 %  Body mass index is 30.61 kg/m².     Input and Output Summary (last 24 hours):       Intake/Output Summary (Last 24 hours) at 12/23/2023 1127  Last data filed at 12/23/2023 1127  Gross per 24 hour   Intake 480 ml   Output 590 ml   Net -110 ml       Physical Exam:     Physical Exam  Vitals reviewed.   Constitutional:       General: She is not in acute distress.     Appearance: Normal appearance.   HENT:      Head: Normocephalic and atraumatic.      Mouth/Throat:      Mouth: Mucous membranes are moist.   Eyes:      Conjunctiva/sclera: Conjunctivae normal.      Pupils: Pupils are equal, round, and reactive to light.   Cardiovascular:      Rate and Rhythm: Normal rate and regular rhythm.      Pulses: Normal pulses.           Carotid pulses are 2+ on the right side and 2+ on the left side.       Radial pulses are 2+ on the right side and 2+ on the left side.        Dorsalis pedis pulses are 2+ on the right side and 2+ on the left side.      Heart sounds: Normal heart sounds, S1 normal and S2 normal. No murmur heard.  Pulmonary:      Effort: No tachypnea, bradypnea or accessory muscle usage.      Breath sounds: Normal breath sounds and air entry. No decreased breath sounds, wheezing, rhonchi or rales.   Musculoskeletal:      Lumbar back: Tenderness (midline) present. Normal range of motion.      Right hip: Normal.      Left hip: Normal.      Right lower leg: Normal. No edema.      Left lower leg: Normal. No edema.      Right ankle: Normal.      Right Achilles Tendon: Normal.      Left ankle: Normal.      Left Achilles Tendon: Normal.   Neurological:      Mental Status: She is alert and oriented to person, place, and time. Mental status is at baseline.      GCS: GCS eye subscore is 4. GCS verbal subscore is 5. GCS motor subscore is 6.      Cranial Nerves: Cranial nerves 2-12 are intact.      Sensory: Sensation is intact.       Motor: Motor function is intact.      Coordination: Coordination is intact.      Gait: Gait is intact.      Deep Tendon Reflexes:      Reflex Scores:       Patellar reflexes are 2+ on the right side and 2+ on the left side.     Comments: Positive phalen's test bilaterally   Power 5/5 throughout   Fine touch sensory intact throughout              Additional Data:     Labs:    Results from last 7 days   Lab Units 12/23/23  0433   WBC Thousand/uL 4.76   HEMOGLOBIN g/dL 13.2   HEMATOCRIT % 40.4   PLATELETS Thousands/uL 190   NEUTROS PCT % 60   LYMPHS PCT % 31   MONOS PCT % 7   EOS PCT % 2     Results from last 7 days   Lab Units 12/23/23  0433   SODIUM mmol/L 136   POTASSIUM mmol/L 3.7   CHLORIDE mmol/L 105   CO2 mmol/L 22   BUN mg/dL 17   CREATININE mg/dL 0.77   ANION GAP mmol/L 9   CALCIUM mg/dL 8.6   ALBUMIN g/dL 3.9   TOTAL BILIRUBIN mg/dL 0.35   ALK PHOS U/L 38   ALT U/L 16   AST U/L 12*   GLUCOSE RANDOM mg/dL 102     Results from last 7 days   Lab Units 12/22/23  1457   INR  1.05             Results from last 7 days   Lab Units 12/23/23  0433 12/22/23  1618   LACTIC ACID mmol/L  --  0.6   PROCALCITONIN ng/ml <0.05  --            * I Have Reviewed All Lab Data Listed Above.  * Additional Pertinent Lab Tests Reviewed: All Labs For Current Hospital Admission Reviewed    Imaging:    Imaging Reports Reviewed Today Include:   CT head without contrast   Final Result      No intracranial hemorrhage or calvarial fracture.                  Workstation performed: [id number]         CT chest abdomen pelvis w contrast   Final Result      1. No acute thoracic, abdominal or pelvic injury. Specifically, no spinal fractures.      2. Moderate right foraminal disc protrusion suggested at L4-5 with associated moderate ipsilateral foraminal stenosis but no discernible compressive neuropathy.      3. Minimally prominent vascular confluence versus 3 mm nodule left lower pulmonary lobe (series 3/91). Based on current Fleischner Society  2017 Guidelines on incidental pulmonary nodule, optional follow-up CT at 12 months can be considered.         Workstation performed: [id number]         CT cervical spine without contrast   Final Result      No cervical spine fracture or traumatic malalignment.                  Workstation performed: [id number]             Imaging Personally Reviewed by Myself Includes:    CT head without contrast   Final Result      No intracranial hemorrhage or calvarial fracture.                  Workstation performed: [id number]         CT chest abdomen pelvis w contrast   Final Result      1. No acute thoracic, abdominal or pelvic injury. Specifically, no spinal fractures.      2. Moderate right foraminal disc protrusion suggested at L4-5 with associated moderate ipsilateral foraminal stenosis but no discernible compressive neuropathy.      3. Minimally prominent vascular confluence versus 3 mm nodule left lower pulmonary lobe (series 3/91). Based on current Fleischner Society 2017 Guidelines on incidental pulmonary nodule, optional follow-up CT at 12 months can be considered.         Workstation performed: [id number]         CT cervical spine without contrast   Final Result      No cervical spine fracture or traumatic malalignment.                  Workstation performed: [id number]               Recent Cultures (last 7 days):           Last 24 Hours Medication List:   Current Facility-Administered Medications   Medication Dose Route Frequency Provider Last Rate    cefTRIAXone  1,000 mg Intravenous Q24H Laurent Castillo PA-C 1,000 mg (12/22/23 2214)    heparin (porcine)  5,000 Units Subcutaneous Q8H [facility] Laurent Castillo PA-C      ketorolac  15 mg Intravenous Once Alden Toth MD      lidocaine  1 patch Topical Daily Laurent Castillo PA-C      methocarbamol  500 mg Oral HS PRN Alden Toth MD      morphine injection  2 mg Intravenous Q6H PRN Laurent Castillo PA-C      nicotine  1 patch Transdermal Daily Laurent Castillo PA-C      ondansetron  4  mg Intravenous Q6H PRN Laurent Castillo PA-C      traMADol  50 mg Oral Q6H PRN Laurent Castillo PA-C          Today, Patient Was Seen By: Alden Toth MD    ** Please Note: Dictation voice to text software may have been used in the creation of this document. **

## 2023-12-24 LAB — CK SERPL-CCNC: 71 U/L (ref 26–192)

## 2023-12-24 PROCEDURE — 99233 SBSQ HOSP IP/OBS HIGH 50: CPT | Performed by: HOSPITALIST

## 2023-12-24 PROCEDURE — 82550 ASSAY OF CK (CPK): CPT | Performed by: HOSPITALIST

## 2023-12-24 RX ORDER — DIPHENHYDRAMINE HCL 25 MG
25 TABLET ORAL ONCE
Status: COMPLETED | OUTPATIENT
Start: 2023-12-24 | End: 2023-12-24

## 2023-12-24 RX ORDER — KETOROLAC TROMETHAMINE 30 MG/ML
15 INJECTION, SOLUTION INTRAMUSCULAR; INTRAVENOUS EVERY 6 HOURS PRN
Status: DISCONTINUED | OUTPATIENT
Start: 2023-12-24 | End: 2023-12-25

## 2023-12-24 RX ORDER — ACETAMINOPHEN 325 MG/1
975 TABLET ORAL EVERY 8 HOURS PRN
Status: DISCONTINUED | OUTPATIENT
Start: 2023-12-24 | End: 2023-12-25

## 2023-12-24 RX ADMIN — DIPHENHYDRAMINE HYDROCHLORIDE 25 MG: 25 TABLET ORAL at 23:19

## 2023-12-24 RX ADMIN — HEPARIN SODIUM 5000 UNITS: 5000 INJECTION INTRAVENOUS; SUBCUTANEOUS at 21:21

## 2023-12-24 RX ADMIN — METHOCARBAMOL TABLETS 500 MG: 500 TABLET, COATED ORAL at 21:21

## 2023-12-24 RX ADMIN — LIDOCAINE 5% 1 PATCH: 700 PATCH TOPICAL at 08:40

## 2023-12-24 RX ADMIN — NICOTINE 1 PATCH: 21 PATCH, EXTENDED RELEASE TRANSDERMAL at 08:40

## 2023-12-24 RX ADMIN — CEFTRIAXONE 1000 MG: 1 INJECTION, SOLUTION INTRAVENOUS at 21:21

## 2023-12-24 RX ADMIN — METHOCARBAMOL TABLETS 500 MG: 500 TABLET, COATED ORAL at 05:30

## 2023-12-24 RX ADMIN — METHOCARBAMOL TABLETS 500 MG: 500 TABLET, COATED ORAL at 13:37

## 2023-12-24 RX ADMIN — KETOROLAC TROMETHAMINE 15 MG: 30 INJECTION, SOLUTION INTRAMUSCULAR at 16:14

## 2023-12-24 RX ADMIN — CYANOCOBALAMIN TAB 1000 MCG 1000 MCG: 1000 TAB at 08:40

## 2023-12-24 RX ADMIN — PREDNISONE 60 MG: 20 TABLET ORAL at 08:40

## 2023-12-24 RX ADMIN — HEPARIN SODIUM 5000 UNITS: 5000 INJECTION INTRAVENOUS; SUBCUTANEOUS at 05:30

## 2023-12-24 NOTE — ASSESSMENT & PLAN NOTE
Tingling/numbness of her hands bilaterally and lower extremities involving the thigh anterior laterally bilaterally and ankle/foot area bilaterally.  Symptoms started around 2 days prior to admission.  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  Unmedicated.  No history of spine surgery  Past medical history is negative for myasthenia gravis/multiple sclerosis/alcohol use disorder  Parasthesias have improved      Plan:   -steroids as under low back pain  -PT OT  -Check vitamin B12 and Folate -- normal limits but B12 low end, will start supplement  -Neurology team consulted; appreciate recommendations  -Continue to follow-up.  -Neurochecks

## 2023-12-24 NOTE — ASSESSMENT & PLAN NOTE
UA shows-trace leukocytes, 4-10 WBCs, moderate bacteria  Patient does admit to urinary frequency  Will initiate IV ceftriaxone will complete 3 days

## 2023-12-24 NOTE — ASSESSMENT & PLAN NOTE
"Patient complains of lower back pan.  Secondary to fall 2 days prior to admission.  CT scan shows-Moderate right foraminal disc protrusion suggested at L4-5 with associated moderate ipsilateral foraminal stenosis but no discernible compressive neuropathy.   Started 60mg Prednsione -- x5 days  Continue pain medication PRN, Robaxin PRN  MRI as recommended by Neurology; to be \"boomerang\" to [facility] for MRI today  OT/PT eval  Supportive care   "

## 2023-12-24 NOTE — PROGRESS NOTES
"[facility]  Progress Note  Name: Luh Chairez I  MRN: [id number]  Unit/Bed#: 419-01 I Date of Admission: 12/22/2023   Date of Service: 12/24/2023 I Hospital Day: 1    Assessment/Plan   Low back pain  Assessment & Plan  Patient complains of lower back pan.  Secondary to fall 2 days prior to admission.  CT scan shows-Moderate right foraminal disc protrusion suggested at L4-5 with associated moderate ipsilateral foraminal stenosis but no discernible compressive neuropathy.   Started 60mg Prednsione -- x5 days  Continue pain medication PRN, Robaxin PRN  MRI as recommended by Neurology; to be \"netta\" to [facility] for MRI today  OT/PT eval  Supportive care     * Paresthesias  Assessment & Plan  Tingling/numbness of her hands bilaterally and lower extremities involving the thigh anterior laterally bilaterally and ankle/foot area bilaterally.  Symptoms started around 2 days prior to admission.  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  Unmedicated.  No history of spine surgery  Past medical history is negative for myasthenia gravis/multiple sclerosis/alcohol use disorder  Parasthesias have improved      Plan:   -steroids as under low back pain  -PT OT  -Check vitamin B12 and Folate -- normal limits but B12 low end, will start supplement  -Neurology team consulted; appreciate recommendations  -Continue to follow-up.  -Neurochecks    Bacteriuria  Assessment & Plan  UA shows-trace leukocytes, 4-10 WBCs, moderate bacteria  Patient does admit to urinary frequency  Will initiate IV ceftriaxone will complete 3 days                   VTE Pharmacologic Prophylaxis: VTE Score: 5 High Risk (Score >/= 5) - Pharmacological DVT Prophylaxis Ordered: heparin. Sequential Compression Devices Ordered.    Mobility:   Basic Mobility Inpatient Raw Score: 23  JH-HLM Goal: 7: Walk 25 feet or more  JH-HLM Achieved: 7: Walk 25 feet or more  HLM Goal " achieved. Continue to encourage appropriate mobility.    Patient Centered Rounds: I performed bedside rounds with nursing staff today.   Discussions with Specialists or Other Care Team Provider: none    Education and Discussions with Family / Patient:  family at bedside.     Total Time Spent on Date of Encounter in care of patient: 34 mins. This time was spent on one or more of the following: performing physical exam; counseling and coordination of care; obtaining or reviewing history; documenting in the medical record; reviewing/ordering tests, medications or procedures; communicating with other healthcare professionals and discussing with patient's family/caregivers.    Current Length of Stay: 1 day(s)  Current Patient Status: Inpatient   Certification Statement: The patient will continue to require additional inpatient hospital stay due to back katelynn, parasthesias, MRI  Discharge Plan: Anticipate discharge in 24-48 hrs to home.    Code Status: Level 1 - Full Code    Subjective:   Patient states paresthesias has improved, she feels resolution of paresthesias on her right leg, but still has them in the same locations of her anterior thigh and dorsal part of the foot from ankle to toes on the left.  Pain is under control    Objective:     Vitals:   Temp (24hrs), Av.4 °F (36.9 °C), Min:98.1 °F (36.7 °C), Max:98.9 °F (37.2 °C)    Temp:  [98.1 °F (36.7 °C)-98.9 °F (37.2 °C)] 98.1 °F (36.7 °C)  HR:  [68-81] 68  Resp:  [17-19] 17  BP: ()/(54-69) 96/54  SpO2:  [95 %-97 %] 95 %  Body mass index is 30.58 kg/m².     Input and Output Summary (last 24 hours):     Intake/Output Summary (Last 24 hours) at 2023 1029  Last data filed at 2023 0815  Gross per 24 hour   Intake 1500 ml   Output 2300 ml   Net -800 ml       Physical Exam:   Physical Exam  Vitals and nursing note reviewed.   Constitutional:       General: She is not in acute distress.     Appearance: She is well-developed.   HENT:      Head:  Normocephalic and atraumatic.   Eyes:      Conjunctiva/sclera: Conjunctivae normal.   Cardiovascular:      Rate and Rhythm: Normal rate and regular rhythm.      Heart sounds: No murmur heard.  Pulmonary:      Effort: Pulmonary effort is normal. No respiratory distress.      Breath sounds: Normal breath sounds.   Abdominal:      Palpations: Abdomen is soft.      Tenderness: There is no abdominal tenderness.   Musculoskeletal:         General: No swelling.      Cervical back: Neck supple.   Skin:     General: Skin is warm and dry.   Neurological:      Mental Status: She is alert.      Comments: Strength equal bilateral lower extremities, numbness of the anterior left thigh and dorsal part of the left foot   Psychiatric:         Mood and Affect: Mood normal.          Additional Data:     Labs:  Results from last 7 days   Lab Units 12/23/23  0433   WBC Thousand/uL 4.76   HEMOGLOBIN g/dL 13.2   HEMATOCRIT % 40.4   PLATELETS Thousands/uL 190   NEUTROS PCT % 60   LYMPHS PCT % 31   MONOS PCT % 7   EOS PCT % 2     Results from last 7 days   Lab Units 12/23/23  0433   SODIUM mmol/L 136   POTASSIUM mmol/L 3.7   CHLORIDE mmol/L 105   CO2 mmol/L 22   BUN mg/dL 17   CREATININE mg/dL 0.77   ANION GAP mmol/L 9   CALCIUM mg/dL 8.6   ALBUMIN g/dL 3.9   TOTAL BILIRUBIN mg/dL 0.35   ALK PHOS U/L 38   ALT U/L 16   AST U/L 12*   GLUCOSE RANDOM mg/dL 102     Results from last 7 days   Lab Units 12/22/23  1457   INR  1.05         Results from last 7 days   Lab Units 12/23/23  0433   HEMOGLOBIN A1C % 6.3*     Results from last 7 days   Lab Units 12/23/23  0433 12/22/23  1618   LACTIC ACID mmol/L  --  0.6   PROCALCITONIN ng/ml <0.05  --        Lines/Drains:  Invasive Devices       Peripheral Intravenous Line  Duration             Peripheral IV 12/22/23 Left Antecubital 1 day    Peripheral IV 12/23/23 Distal;Dorsal (posterior);Left Forearm 1 day                          Imaging: No pertinent imaging reviewed.    Recent Cultures (last 7 days):          Last 24 Hours Medication List:   Current Facility-Administered Medications   Medication Dose Route Frequency Provider Last Rate    cefTRIAXone  1,000 mg Intravenous Q24H Gerson Calle DO 1,000 mg (12/23/23 2108)    cyanocobalamin  1,000 mcg Oral Daily Gerson Calle DO      heparin (porcine)  5,000 Units Subcutaneous Q8H [facility] Laurent Castillo PA-C      lidocaine  1 patch Topical Daily Laurent Castillo PA-C      methocarbamol  500 mg Oral Q8H [facility] Alden Toth MD      nicotine  1 patch Transdermal Daily Laurent Castillo PA-C      ondansetron  4 mg Intravenous Q6H PRN Laurent Castillo PA-C      predniSONE  60 mg Oral Daily Alden Toth MD          Today, Patient Was Seen By: Gerson Calle DO    **Please Note: This note may have been constructed using a voice recognition system.**

## 2023-12-25 PROBLEM — E53.8 VITAMIN B12 DEFICIENCY: Status: ACTIVE | Noted: 2023-12-25

## 2023-12-25 PROBLEM — R79.89 ELEVATED TSH: Status: ACTIVE | Noted: 2023-12-25

## 2023-12-25 PROBLEM — N30.01 ACUTE CYSTITIS WITH HEMATURIA: Status: ACTIVE | Noted: 2023-12-22

## 2023-12-25 PROBLEM — M54.16 LUMBAR RADICULOPATHY: Status: ACTIVE | Noted: 2023-12-22

## 2023-12-25 LAB
BACTERIA UR QL AUTO: ABNORMAL /HPF
BILIRUB UR QL STRIP: NEGATIVE
CLARITY UR: CLEAR
COLOR UR: YELLOW
GLUCOSE UR STRIP-MCNC: NEGATIVE MG/DL
HGB UR QL STRIP.AUTO: ABNORMAL
KETONES UR STRIP-MCNC: NEGATIVE MG/DL
LEUKOCYTE ESTERASE UR QL STRIP: NEGATIVE
NITRITE UR QL STRIP: NEGATIVE
NON-SQ EPI CELLS URNS QL MICRO: ABNORMAL /HPF
PH UR STRIP.AUTO: 6 [PH]
PROT UR STRIP-MCNC: NEGATIVE MG/DL
RBC #/AREA URNS AUTO: ABNORMAL /HPF
SP GR UR STRIP.AUTO: 1.02 (ref 1–1.03)
UROBILINOGEN UR QL STRIP.AUTO: 0.2 E.U./DL
WBC #/AREA URNS AUTO: ABNORMAL /HPF

## 2023-12-25 PROCEDURE — 99447 NTRPROF PH1/NTRNET/EHR 11-20: CPT | Performed by: NURSE PRACTITIONER

## 2023-12-25 PROCEDURE — 81001 URINALYSIS AUTO W/SCOPE: CPT | Performed by: INTERNAL MEDICINE

## 2023-12-25 PROCEDURE — 87086 URINE CULTURE/COLONY COUNT: CPT | Performed by: INTERNAL MEDICINE

## 2023-12-25 PROCEDURE — 99232 SBSQ HOSP IP/OBS MODERATE 35: CPT | Performed by: INTERNAL MEDICINE

## 2023-12-25 RX ORDER — ENOXAPARIN SODIUM 100 MG/ML
40 INJECTION SUBCUTANEOUS EVERY 24 HOURS
Status: DISCONTINUED | OUTPATIENT
Start: 2023-12-25 | End: 2023-12-26 | Stop reason: HOSPADM

## 2023-12-25 RX ORDER — TRAZODONE HYDROCHLORIDE 50 MG/1
50 TABLET ORAL ONCE
Status: COMPLETED | OUTPATIENT
Start: 2023-12-26 | End: 2023-12-26

## 2023-12-25 RX ORDER — METHYLPREDNISOLONE SODIUM SUCCINATE 40 MG/ML
40 INJECTION, POWDER, LYOPHILIZED, FOR SOLUTION INTRAMUSCULAR; INTRAVENOUS EVERY 8 HOURS SCHEDULED
Status: DISCONTINUED | OUTPATIENT
Start: 2023-12-25 | End: 2023-12-26 | Stop reason: HOSPADM

## 2023-12-25 RX ORDER — ACETAMINOPHEN 325 MG/1
650 TABLET ORAL EVERY 6 HOURS PRN
Status: DISCONTINUED | OUTPATIENT
Start: 2023-12-25 | End: 2023-12-26 | Stop reason: HOSPADM

## 2023-12-25 RX ORDER — KETOROLAC TROMETHAMINE 30 MG/ML
15 INJECTION, SOLUTION INTRAMUSCULAR; INTRAVENOUS EVERY 6 HOURS SCHEDULED
Status: DISCONTINUED | OUTPATIENT
Start: 2023-12-25 | End: 2023-12-26 | Stop reason: HOSPADM

## 2023-12-25 RX ADMIN — LIDOCAINE 5% 1 PATCH: 700 PATCH TOPICAL at 09:27

## 2023-12-25 RX ADMIN — MORPHINE SULFATE 2 MG: 2 INJECTION, SOLUTION INTRAMUSCULAR; INTRAVENOUS at 21:13

## 2023-12-25 RX ADMIN — METHYLPREDNISOLONE SODIUM SUCCINATE 40 MG: 40 INJECTION, POWDER, FOR SOLUTION INTRAMUSCULAR; INTRAVENOUS at 14:16

## 2023-12-25 RX ADMIN — METHOCARBAMOL TABLETS 500 MG: 500 TABLET, COATED ORAL at 14:15

## 2023-12-25 RX ADMIN — METHYLPREDNISOLONE SODIUM SUCCINATE 40 MG: 40 INJECTION, POWDER, FOR SOLUTION INTRAMUSCULAR; INTRAVENOUS at 09:23

## 2023-12-25 RX ADMIN — CYANOCOBALAMIN TAB 1000 MCG 1000 MCG: 1000 TAB at 09:23

## 2023-12-25 RX ADMIN — METHOCARBAMOL TABLETS 500 MG: 500 TABLET, COATED ORAL at 21:13

## 2023-12-25 RX ADMIN — METHYLPREDNISOLONE SODIUM SUCCINATE 40 MG: 40 INJECTION, POWDER, FOR SOLUTION INTRAMUSCULAR; INTRAVENOUS at 21:13

## 2023-12-25 RX ADMIN — KETOROLAC TROMETHAMINE 15 MG: 30 INJECTION, SOLUTION INTRAMUSCULAR; INTRAVENOUS at 09:23

## 2023-12-25 RX ADMIN — ACETAMINOPHEN 650 MG: 325 TABLET, FILM COATED ORAL at 15:56

## 2023-12-25 RX ADMIN — NICOTINE 1 PATCH: 21 PATCH, EXTENDED RELEASE TRANSDERMAL at 09:27

## 2023-12-25 RX ADMIN — KETOROLAC TROMETHAMINE 15 MG: 30 INJECTION, SOLUTION INTRAMUSCULAR; INTRAVENOUS at 23:43

## 2023-12-25 RX ADMIN — KETOROLAC TROMETHAMINE 15 MG: 30 INJECTION, SOLUTION INTRAMUSCULAR; INTRAVENOUS at 17:25

## 2023-12-25 RX ADMIN — ENOXAPARIN SODIUM 40 MG: 40 INJECTION SUBCUTANEOUS at 14:15

## 2023-12-25 RX ADMIN — METHOCARBAMOL TABLETS 500 MG: 500 TABLET, COATED ORAL at 05:32

## 2023-12-25 RX ADMIN — HEPARIN SODIUM 5000 UNITS: 5000 INJECTION INTRAVENOUS; SUBCUTANEOUS at 05:32

## 2023-12-25 NOTE — PROGRESS NOTES
"[facility]  Progress Note  Name: Luh Chairez I  MRN: [id number]  Unit/Bed#: 419-01 I Date of Admission: 12/22/2023   Date of Service: 12/25/2023 I Hospital Day: 2    Assessment/Plan   * Lumbar radiculopathy  Assessment & Plan  Treat with methylprednisolone 40 mg IV every 8 hours  Utilize Toradol 15 mg IV every 6 hours and Robaxin 500 mg PO every 8 hours  May require an DONOVAN (epidural spinal injection) of the lumbar spine  Consult Neurosurgery  PT/OT evaluations    MRI of the lumbar spine (12/24/2023):  IMPRESSION:     1. Marrow edema in the right L5 pars interarticularis possibly a bone contusion or perhaps subtle spondylolysis although no definite fracture lucency is noted on the recent CT from 2 days ago.. No subluxation.  2. Mild spondylosis most pronounced at L4-5 with a right neural foraminal disc protrusion.    Low back pain  Assessment & Plan  Please see the assessment and plan for \"Lumbar radiculopathy\"    Leg weakness, bilateral  Assessment & Plan  Please see the assessment and plan for \"Lumbar radiculopathy\"    Elevated TSH  Assessment & Plan  Recheck a TSH level and free T4 level in 1-2 weeks with her PCP    Vitamin B12 deficiency  Assessment & Plan  Continue PO cyanocobalamin supplementation  Outpatient follow-up with PCP in regards to this matter      Fall on same level  Assessment & Plan  PT/OT evaluations    Bacteriuria  Assessment & Plan  Completed a 3-day course of IV ceftriaxone during the hospitalization        Smoker  Assessment & Plan  Patient reports smoking 2 packs daily  Nicotine patch  Smoking cessation counseling        VTE Pharmacologic Prophylaxis: VTE Score: 5 High Risk (Score >/= 5) - Pharmacological DVT Prophylaxis Ordered: enoxaparin (Lovenox). Sequential Compression Devices Ordered.    Mobility:   Basic Mobility Inpatient Raw Score: 23  JH-HLM Goal: 7: Walk 25 feet or more  JH-HLM Achieved: 6: Walk 10 steps or more  HLM Goal NOT achieved. Continue with " multidisciplinary rounding and encourage appropriate mobility to improve upon HLM goals.    Patient Centered Rounds: I performed bedside rounds with nursing staff today.     Education and Discussions with Family / Patient: Updated  () at bedside.    Total Time Spent on Date of Encounter in care of patient: 35 mins. This time was spent on one or more of the following: performing physical exam; counseling and coordination of care; obtaining or reviewing history; documenting in the medical record; reviewing/ordering tests, medications or procedures; communicating with other healthcare professionals and discussing with patient's family/caregivers.    Current Length of Stay: 2 day(s)  Current Patient Status: Inpatient   Certification Statement: The patient will continue to require additional inpatient hospital stay due to the need for IV methylprednisolone treatment and for IV Toradol treatment.  Discharge Plan: Anticipate discharge in 24-48 hrs to home.    Code Status: Level 1 - Full Code    Subjective:   The patient was seen and examined.  The patient is experiencing severe lower back pain radiating down both lower extremities.  She is also experiencing bilateral, lower extremity weakness as well as paresthesias of both lower extremities.      Objective:     Vitals:   Temp (24hrs), Av.9 °F (36.6 °C), Min:97.8 °F (36.6 °C), Max:98 °F (36.7 °C)    Temp:  [97.8 °F (36.6 °C)-98 °F (36.7 °C)] 97.8 °F (36.6 °C)  HR:  [65-81] 65  Resp:  [18-20] 20  BP: (112-113)/(64-76) 112/76  SpO2:  [95 %-97 %] 97 %  Body mass index is 30.58 kg/m².     Input and Output Summary (last 24 hours):     Intake/Output Summary (Last 24 hours) at 2023 1146  Last data filed at 2023 0819  Gross per 24 hour   Intake 900 ml   Output 400 ml   Net 500 ml       Physical Exam:   Physical Exam  General:  NAD, follows commands  HEENT:  NC/AT, mucous membranes moist  Neck:  Supple, No JVP elevation  CV:  + S1, + S2,  RRR  Pulm:  Lung fields are CTA bilaterally  Abd:  Soft, Non-tender, Non-distended  Ext:  No clubbing/cyanosis/edema  Skin:  No rashes  Neuro:  Awake, alert, oriented  Psych:  Normal mood and affect      Additional Data:    Labs:  Results from last 7 days   Lab Units 12/23/23  0433   WBC Thousand/uL 4.76   HEMOGLOBIN g/dL 13.2   HEMATOCRIT % 40.4   PLATELETS Thousands/uL 190   NEUTROS PCT % 60   LYMPHS PCT % 31   MONOS PCT % 7   EOS PCT % 2     Results from last 7 days   Lab Units 12/23/23  0433   SODIUM mmol/L 136   POTASSIUM mmol/L 3.7   CHLORIDE mmol/L 105   CO2 mmol/L 22   BUN mg/dL 17   CREATININE mg/dL 0.77   ANION GAP mmol/L 9   CALCIUM mg/dL 8.6   ALBUMIN g/dL 3.9   TOTAL BILIRUBIN mg/dL 0.35   ALK PHOS U/L 38   ALT U/L 16   AST U/L 12*   GLUCOSE RANDOM mg/dL 102     Results from last 7 days   Lab Units 12/22/23  1457   INR  1.05         Results from last 7 days   Lab Units 12/23/23  0433   HEMOGLOBIN A1C % 6.3*     Results from last 7 days   Lab Units 12/23/23  0433 12/22/23  1618   LACTIC ACID mmol/L  --  0.6   PROCALCITONIN ng/ml <0.05  --        Lines/Drains:  Invasive Devices       Peripheral Intravenous Line  Duration             Peripheral IV 12/22/23 Left Antecubital 2 days    Peripheral IV 12/23/23 Distal;Dorsal (posterior);Left Forearm 2 days                          Imaging: No pertinent imaging reviewed.    Recent Cultures (last 7 days):         Last 24 Hours Medication List:   Current Facility-Administered Medications   Medication Dose Route Frequency Provider Last Rate    acetaminophen  650 mg Oral Q6H PRN Rodrigo Mar DO      cyanocobalamin  1,000 mcg Oral Daily Gerson Calle, DO      heparin (porcine)  5,000 Units Subcutaneous Q8H ADRIANNA Castillo PA-C      ketorolac  15 mg Intravenous Q6H ADRIANNA Rodrigo Mar DO      lidocaine  1 patch Topical Daily Laurent Castillo PA-C      methocarbamol  500 mg Oral Q8H [facility] Alden Toth MD      methylPREDNISolone sodium succinate  40 mg  Intravenous Q8H [facility] Rodrigo Mar DO      morphine injection  2 mg Intravenous Q4H PRN Rodrigo Mar DO      nicotine  1 patch Transdermal Daily Laurent Castillo PA-C      ondansetron  4 mg Intravenous Q6H PRN Laurent Castillo PA-C          Today, Patient Was Seen By: Rodrigo Mar DO    **Please Note: This note may have been constructed using a voice recognition system.**

## 2023-12-25 NOTE — ASSESSMENT & PLAN NOTE
Continue PO cyanocobalamin supplementation  Outpatient follow-up with PCP in regards to this matter

## 2023-12-25 NOTE — PLAN OF CARE
Problem: PAIN - ADULT  Goal: Verbalizes/displays adequate comfort level or baseline comfort level  Description: Interventions:  - Encourage patient to monitor pain and request assistance  - Assess pain using appropriate pain scale (0-10 pain scale)  - Administer analgesics based on type and severity of pain and evaluate response  - Implement non-pharmacological measures as appropriate and evaluate response  - Consider cultural and social influences on pain and pain management  - Notify physician/advanced practitioner if interventions unsuccessful or patient reports new pain  Outcome: Progressing     Problem: SAFETY ADULT  Goal: Patient will remain free of falls  Description: INTERVENTIONS:  - Educate patient/family on patient safety including physical limitations  - Instruct patient to call for assistance with activity (standby assist)  - Consult OT/PT to assist with strengthening/mobility   - Keep Call bell within reach  - Keep bed low and locked with side rails adjusted as appropriate  - Keep care items and personal belongings within reach  - Initiate and maintain comfort rounds  - Make Fall Risk Sign visible to staff (high fall risk)  - Offer Toileting every 1-2 Hours, in advance of need  - Obtain necessary fall risk management equipment: nonskid footwear  - Apply yellow socks and bracelet for high fall risk patients  Outcome: Progressing  Goal: Maintain or return to baseline ADL function  Description: INTERVENTIONS:  -  Assess patient's ability to carry out ADLs; (min assist)  - Assess/evaluate cause of self-care deficits (fatigue, pain)  - Assess range of motion  - Assess patient's mobility; (standby assist)  - Assess patient's need for assistive devices and provide as appropriate  - Encourage maximum independence but intervene and supervise when necessary  - Involve family in performance of ADLs  - Assess for home care needs following discharge   - Consider OT consult to assist with ADL evaluation and  planning for discharge  - Provide patient education as appropriate  Outcome: Progressing  Goal: Maintains/Returns to pre admission functional level  Description: INTERVENTIONS:  - Perform AM-PAC 6 Click Basic Mobility/ Daily Activity assessment daily.  - Set and communicate daily mobility goal to care team and patient/family/caregiver.   - Collaborate with rehabilitation services on mobility goals if consulted  - Ambulate patient 3-6 times a day  - Out of bed to chair 3 times a day   - Out of bed for meals 3 times a day  - Out of bed for toileting  - Record patient progress and toleration of activity level   Outcome: Progressing     Problem: DISCHARGE PLANNING  Goal: Discharge to home or other facility with appropriate resources  Description: INTERVENTIONS:  - Identify barriers to discharge w/patient and caregiver  - Arrange for needed discharge resources and transportation as appropriate  - Identify discharge learning needs (meds, wound care, etc.)  - Refer to Case Management Department for coordinating discharge planning if the patient needs post-hospital services based on physician/advanced practitioner order or complex needs related to functional status, cognitive ability, or social support system  Outcome: Progressing     Problem: METABOLIC, FLUID AND ELECTROLYTES - ADULT  Goal: Electrolytes maintained within normal limits  Description: INTERVENTIONS:  - Monitor labs and assess patient for signs and symptoms of electrolyte imbalances  - Administer electrolyte replacement as ordered  - Monitor response to electrolyte replacements, including repeat lab results as appropriate  - Instruct patient on fluid and nutrition as appropriate  Outcome: Progressing  Goal: Fluid balance maintained  Description: INTERVENTIONS:  - Monitor labs   - Monitor I/O and WT  - Instruct patient on fluid and nutrition as appropriate  - Assess for signs & symptoms of volume excess or deficit  Outcome: Progressing     Problem: INFECTION -  ADULT  Goal: Absence or prevention of progression during hospitalization  Description: INTERVENTIONS:  - Assess and monitor for signs and symptoms of infection  - Monitor lab/diagnostic results  - Monitor all insertion sites, i.e. indwelling lines  - Administer medications as ordered  - Instruct and encourage patient and family to use good hand hygiene technique  Outcome: Progressing     Problem: Knowledge Deficit  Goal: Patient/family/caregiver demonstrates understanding of disease process, treatment plan, medications, and discharge instructions  Description: Complete learning assessment and assess knowledge base.  Interventions:  - Provide teaching at level of understanding  - Provide teaching via preferred learning methods  Outcome: Progressing

## 2023-12-25 NOTE — CONSULTS
"e-Consult ([facility])     Inpatient consult to Neurosurgery  Consult performed by: REMI Conn  Consult ordered by: DO Luh Barillas 48 y.o. female MRN: [id number]  Unit/Bed#: 419-01 Encounter: [id number]    Reason for Consult    Per provider report, patient presented to [facility] emergency department on 12/22/2023 with complaint of numbness and tingling in upper and lower extremities and worsening low back pain.  She reportedly fell on 12/20 and was seen in a local emergency room and discharged without acute findings noted on imaging.  She was discharged with muscle relaxer and pain medication.  She states numbness and tingling in upper extremities improved to only her fingers.  Neurology was consulted.    She was noted on imaging with questionable marrow edema or to the right L5 pars representing questionable bone contusion versus spondylolysis.  For this reason neurosurgery was consulted.  She is also being treated for a urinary tract infection.  She has a history of tobacco use and smokes 2 packs/day.  Additional past medical history including hyperlipidemia.. Available past medical history,social history, surgical history, medication list, drug allergies and review of systems were reviewed.    /76   Pulse 65   Temp 97.8 °F (36.6 °C)   Resp 20   Ht 5' 4\" (1.626 m)   Wt 80.8 kg (178 lb 2.1 oz)   SpO2 97%   BMI 30.58 kg/m²      Clinical exam per provider report, reportedly nonfocal.  No motor strength weakness.  No bowel or bladder issues, no urinary retention.    Imaging personally reviewed.  MRI of lumbar spine reviewed indicating marrow edema to right L5 pars interarticularis.  Mild degenerative disease with spondylosis particularly at L4-5 with small right disc protrusion.  CT of chest abdomen pelvis reviewed as well without indication of acute fracture or osseous lesions.    Assessment and Recommendations    1.  Continue to monitor neurologic " exam closely.  2.  Finding likely represents sequela of trauma and can be treated as acute contusion.  3.  Finding on lumbar spine does not explain upper extremity weakness/numbness.  4.  Recommend multimodal pain regimen including NSAIDs.  Patient has been started on steroids.  5.  Mobilize as tolerated with PT/OT.  6.  Patient can follow-up outpatient as desired.  7.  Neurosurgery will sign off.  Call with questions or concerns.    All questions answered. Provider is in agreement with the course of action. 11-20 minutes, >50% of the total time devoted to medical consultative verbal/EMR discussion between providers. Written report will be generated in the EMR.

## 2023-12-25 NOTE — ASSESSMENT & PLAN NOTE
Treat with methylprednisolone 40 mg IV every 8 hours  Utilize Toradol 15 mg IV every 6 hours and Robaxin 500 mg PO every 8 hours  May require an DONOVAN (epidural spinal injection) of the lumbar spine  Consult Neurosurgery  PT/OT evaluations    MRI of the lumbar spine (12/24/2023):  IMPRESSION:     1. Marrow edema in the right L5 pars interarticularis possibly a bone contusion or perhaps subtle spondylolysis although no definite fracture lucency is noted on the recent CT from 2 days ago.. No subluxation.  2. Mild spondylosis most pronounced at L4-5 with a right neural foraminal disc protrusion.

## 2023-12-26 VITALS
RESPIRATION RATE: 19 BRPM | TEMPERATURE: 97.9 F | BODY MASS INDEX: 30.52 KG/M2 | OXYGEN SATURATION: 95 % | SYSTOLIC BLOOD PRESSURE: 117 MMHG | WEIGHT: 178.79 LBS | DIASTOLIC BLOOD PRESSURE: 67 MMHG | HEIGHT: 64 IN | HEART RATE: 66 BPM

## 2023-12-26 LAB
25(OH)D3 SERPL-MCNC: 20.1 NG/ML (ref 30–100)
ALBUMIN SERPL BCP-MCNC: 4.1 G/DL (ref 3.5–5)
ALP SERPL-CCNC: 43 U/L (ref 34–104)
ALT SERPL W P-5'-P-CCNC: 65 U/L (ref 7–52)
ANION GAP SERPL CALCULATED.3IONS-SCNC: 12 MMOL/L
AST SERPL W P-5'-P-CCNC: 26 U/L (ref 13–39)
ATRIAL RATE: 72 BPM
BASOPHILS # BLD AUTO: 0.01 THOUSANDS/ÂΜL (ref 0–0.1)
BASOPHILS NFR BLD AUTO: 0 % (ref 0–1)
BILIRUB SERPL-MCNC: 0.3 MG/DL (ref 0.2–1)
BUN SERPL-MCNC: 20 MG/DL (ref 5–25)
CALCIUM SERPL-MCNC: 9.3 MG/DL (ref 8.4–10.2)
CHLORIDE SERPL-SCNC: 103 MMOL/L (ref 96–108)
CK SERPL-CCNC: 30 U/L (ref 26–192)
CO2 SERPL-SCNC: 22 MMOL/L (ref 21–32)
CREAT SERPL-MCNC: 0.68 MG/DL (ref 0.6–1.3)
EOSINOPHIL # BLD AUTO: 0 THOUSAND/ÂΜL (ref 0–0.61)
EOSINOPHIL NFR BLD AUTO: 0 % (ref 0–6)
ERYTHROCYTE [DISTWIDTH] IN BLOOD BY AUTOMATED COUNT: 12 % (ref 11.6–15.1)
GFR SERPL CREATININE-BSD FRML MDRD: 102 ML/MIN/1.73SQ M
GLUCOSE SERPL-MCNC: 141 MG/DL (ref 65–140)
HAV IGM SER QL: NORMAL
HBV CORE IGM SER QL: NORMAL
HBV SURFACE AG SER QL: NORMAL
HCT VFR BLD AUTO: 40 % (ref 34.8–46.1)
HCV AB SER QL: NORMAL
HGB BLD-MCNC: 13.6 G/DL (ref 11.5–15.4)
IMM GRANULOCYTES # BLD AUTO: 0.12 THOUSAND/UL (ref 0–0.2)
IMM GRANULOCYTES NFR BLD AUTO: 1 % (ref 0–2)
LYMPHOCYTES # BLD AUTO: 1.83 THOUSANDS/ÂΜL (ref 0.6–4.47)
LYMPHOCYTES NFR BLD AUTO: 13 % (ref 14–44)
MAGNESIUM SERPL-MCNC: 2.1 MG/DL (ref 1.9–2.7)
MCH RBC QN AUTO: 31.6 PG (ref 26.8–34.3)
MCHC RBC AUTO-ENTMCNC: 34 G/DL (ref 31.4–37.4)
MCV RBC AUTO: 93 FL (ref 82–98)
MONOCYTES # BLD AUTO: 0.31 THOUSAND/ÂΜL (ref 0.17–1.22)
MONOCYTES NFR BLD AUTO: 2 % (ref 4–12)
NEUTROPHILS # BLD AUTO: 11.41 THOUSANDS/ÂΜL (ref 1.85–7.62)
NEUTS SEG NFR BLD AUTO: 84 % (ref 43–75)
NRBC BLD AUTO-RTO: 0 /100 WBCS
P AXIS: 67 DEGREES
PHOSPHATE SERPL-MCNC: 5.1 MG/DL (ref 2.7–4.5)
PLATELET # BLD AUTO: 223 THOUSANDS/UL (ref 149–390)
PMV BLD AUTO: 10.1 FL (ref 8.9–12.7)
POTASSIUM SERPL-SCNC: 4 MMOL/L (ref 3.5–5.3)
PR INTERVAL: 126 MS
PROCALCITONIN SERPL-MCNC: <0.05 NG/ML
PROT SERPL-MCNC: 6.3 G/DL (ref 6.4–8.4)
QRS AXIS: 66 DEGREES
QRSD INTERVAL: 68 MS
QT INTERVAL: 414 MS
QTC INTERVAL: 453 MS
RBC # BLD AUTO: 4.3 MILLION/UL (ref 3.81–5.12)
SODIUM SERPL-SCNC: 137 MMOL/L (ref 135–147)
T WAVE AXIS: 71 DEGREES
VENTRICULAR RATE: 72 BPM
WBC # BLD AUTO: 13.68 THOUSAND/UL (ref 4.31–10.16)

## 2023-12-26 PROCEDURE — 82550 ASSAY OF CK (CPK): CPT | Performed by: INTERNAL MEDICINE

## 2023-12-26 PROCEDURE — 85025 COMPLETE CBC W/AUTO DIFF WBC: CPT | Performed by: INTERNAL MEDICINE

## 2023-12-26 PROCEDURE — 99239 HOSP IP/OBS DSCHRG MGMT >30: CPT | Performed by: INTERNAL MEDICINE

## 2023-12-26 PROCEDURE — 80053 COMPREHEN METABOLIC PANEL: CPT | Performed by: INTERNAL MEDICINE

## 2023-12-26 PROCEDURE — 93010 ELECTROCARDIOGRAM REPORT: CPT | Performed by: INTERNAL MEDICINE

## 2023-12-26 PROCEDURE — 80074 ACUTE HEPATITIS PANEL: CPT | Performed by: INTERNAL MEDICINE

## 2023-12-26 PROCEDURE — 84100 ASSAY OF PHOSPHORUS: CPT | Performed by: INTERNAL MEDICINE

## 2023-12-26 PROCEDURE — 84145 PROCALCITONIN (PCT): CPT | Performed by: INTERNAL MEDICINE

## 2023-12-26 PROCEDURE — 82306 VITAMIN D 25 HYDROXY: CPT | Performed by: INTERNAL MEDICINE

## 2023-12-26 PROCEDURE — 83735 ASSAY OF MAGNESIUM: CPT | Performed by: INTERNAL MEDICINE

## 2023-12-26 RX ORDER — PREDNISONE 20 MG/1
60 TABLET ORAL DAILY
Qty: 15 TABLET | Refills: 0 | Status: SHIPPED | OUTPATIENT
Start: 2023-12-26 | End: 2023-12-31

## 2023-12-26 RX ORDER — MELOXICAM 7.5 MG/1
15 TABLET ORAL DAILY
Qty: 20 TABLET | Refills: 0 | Status: SHIPPED | OUTPATIENT
Start: 2023-12-26 | End: 2024-01-05

## 2023-12-26 RX ADMIN — KETOROLAC TROMETHAMINE 15 MG: 30 INJECTION, SOLUTION INTRAMUSCULAR; INTRAVENOUS at 12:19

## 2023-12-26 RX ADMIN — METHOCARBAMOL TABLETS 500 MG: 500 TABLET, COATED ORAL at 05:48

## 2023-12-26 RX ADMIN — NICOTINE 1 PATCH: 21 PATCH, EXTENDED RELEASE TRANSDERMAL at 09:03

## 2023-12-26 RX ADMIN — LIDOCAINE 5% 1 PATCH: 700 PATCH TOPICAL at 09:03

## 2023-12-26 RX ADMIN — CYANOCOBALAMIN TAB 1000 MCG 1000 MCG: 1000 TAB at 09:03

## 2023-12-26 RX ADMIN — KETOROLAC TROMETHAMINE 15 MG: 30 INJECTION, SOLUTION INTRAMUSCULAR; INTRAVENOUS at 05:48

## 2023-12-26 RX ADMIN — METHYLPREDNISOLONE SODIUM SUCCINATE 40 MG: 40 INJECTION, POWDER, FOR SOLUTION INTRAMUSCULAR; INTRAVENOUS at 05:48

## 2023-12-26 RX ADMIN — TRAZODONE HYDROCHLORIDE 50 MG: 50 TABLET ORAL at 00:07

## 2023-12-26 NOTE — DISCHARGE SUMMARY
"[facility]  Discharge- Luh Chairez 1975, 48 y.o. female MRN: [id number]  Unit/Bed#: 419-01 Encounter: [id number]  Primary Care Provider: REMI Murry   Date and time admitted to hospital: 12/22/2023  1:04 PM    * Lumbar radiculopathy  Assessment & Plan  Treat with methylprednisolone 40 mg IV every 8 hours which was transition to prednisone 60 mg burst x 10 days at discharge  To continue Robaxin 500 mg every 8 hours as needed and meloxicam 15 mg twice daily for 10 days  Outpatient referral to spine/pain management for possible epidural injection  Referral to PT/OT  Red flag signs reviewed with patient who expressed understanding    MRI of the lumbar spine (12/24/2023):  IMPRESSION:     1. Marrow edema in the right L5 pars interarticularis possibly a bone contusion or perhaps subtle spondylolysis although no definite fracture lucency is noted on the recent CT from 2 days ago.. No subluxation.  2. Mild spondylosis most pronounced at L4-5 with a right neural foraminal disc protrusion.    Elevated TSH  Assessment & Plan  Recheck a TSH level and free T4 level in 1-2 weeks with her PCP    Vitamin B12 deficiency  Assessment & Plan  Continue PO cyanocobalamin supplementation  Outpatient follow-up with PCP in regards to this matter      Leg weakness, bilateral  Assessment & Plan  Please see the assessment and plan for \"Lumbar radiculopathy\"    Fall on same level  Assessment & Plan  PT/OT evaluation, discharged with outpatient PT    Acute cystitis with hematuria  Assessment & Plan  Completed a 3-day course of IV ceftriaxone during the hospitalization        Smoker  Assessment & Plan  Patient reports smoking 2 packs daily  Nicotine patch  Smoking cessation counseling    Low back pain  Assessment & Plan  Please see the assessment and plan for \"Lumbar radiculopathy\"        Medical Problems       Resolved Problems  Date Reviewed: 12/26/2023   None       Discharging Physician / " Practitioner: Mary Feliciano PA-C  PCP: REMI Murry  Admission Date:   Admission Orders (From admission, onward)       Ordered        12/23/23 1410  Inpatient Admission  Once            12/22/23 2044  Place in Observation  Once                          Discharge Date: 12/26/2023    Consultations During Hospital Stay:  Neurosurgery    Procedures Performed:   None    Significant Findings / Test Results:   CT chest abdomen pelvis with contrast:No acute thoracic, abdominal or pelvic injury. Specifically, no spinal fractures. Moderate right foraminal disc protrusion suggested at L4-5 with associated moderate ipsilateral foraminal stenosis but no discernible compressive neuropathy. Minimally prominent vascular confluence versus 3 mm nodule left lower pulmonary lobe (series 3/91). Based on current Fleischner Society 2017 Guidelines on incidental pulmonary nodule, optional follow-up CT at 12 months can be considered.   CT cervical spine: No cervical spine fracture or traumatic malalignment.   CT head wo:No intracranial hemorrhage or calvarial fracture.   MRI lumbar spine: Marrow edema in the right L5 pars interarticularis possibly a bone contusion or perhaps subtle spondylolysis although no definite fracture lucency is noted on the recent CT from 2 days ago.. No subluxation. Mild spondylosis most pronounced at L4-5 with a right neural foraminal disc protrusion.     Incidental Findings:   None       Test Results Pending at Discharge (will require follow up):   None     Outpatient Tests Requested:  Outpatient follow-up with finding pain management  Outpatient follow-up with physical therapy    Complications: None    Reason for Admission: back pain after fall    Hospital Course:   Luh Chairez is a 48 y.o. female patient who originally presented to the hospital on 12/22/2023 due to lumbar radiculopathy after sustaining fall.  Also with some numbness and tingling of her bilateral upper fingers.   "Patient underwent CT head, CT cervical spine without acute findings.  Noted with L4-L5 disc herniation.  Initially, neurology was consulted who felt symptoms were from lumbar radiculitis however recommended MRI given the pressure reported with standing and walking.  MRI lumbar spine obtained revealing marrow edema in the right L5 pars interarticularis possibly a bone contusion and subtle spondylolysis. mild spondylosis most pronounced at L4-5 with a right neural foraminal disc protrusion. Neurosurgery was consulted who recommended continuing a multimodal pain regimen including NSAIDs.  Instructed to follow-up with outpatient if desired.  Patient was initiated on IV steroids, muscle relaxers and IV Toradol with improvement in symptoms.  Finger paresthesias improved with initiation of B12.  Patient was given referral to physical therapy and pain and spine management at discharge.  Can consider lumbar epidural injection if symptoms worsen/persist.  Patient instructed on red flag symptoms.  She was given prednisone taper, Robaxin and meloxicam at discharge.  She was discharged home in stable condition.        Please see above list of diagnoses and related plan for additional information.     Condition at Discharge: stable    Discharge Day Visit / Exam:   Subjective: Notes ongoing bilateral paresthesias in legs and weakness.  However states it is much improved from prior.  Also reports improvement in bilateral finger paresthesias.  No new symptoms  Vitals: Blood Pressure: 117/67 (12/26/23 0653)  Pulse: 66 (12/26/23 0653)  Temperature: 97.9 °F (36.6 °C) (12/26/23 0653)  Temp Source: Oral (12/25/23 1418)  Respirations: 19 (12/26/23 0653)  Height: 5' 4\" (162.6 cm) (12/22/23 2255)  Weight - Scale: 81.1 kg (178 lb 12.7 oz) (12/26/23 0600)  SpO2: 95 % (12/26/23 0653)  Exam:   Physical Exam  HENT:      Head: Normocephalic and atraumatic.   Cardiovascular:      Rate and Rhythm: Normal rate and regular rhythm.   Pulmonary:      " Effort: Pulmonary effort is normal. No respiratory distress.      Breath sounds: Normal breath sounds.   Abdominal:      General: Abdomen is flat. Bowel sounds are normal. There is no distension.      Palpations: Abdomen is soft.   Musculoskeletal:      Right lower leg: No edema.      Left lower leg: No edema.   Skin:     General: Skin is warm and dry.   Neurological:      General: No focal deficit present.      Mental Status: She is alert and oriented to person, place, and time.      Cranial Nerves: No cranial nerve deficit.      Sensory: No sensory deficit.      Motor: No weakness.   Psychiatric:         Mood and Affect: Mood normal.         Behavior: Behavior normal.          Discussion with Family: Patient declined call to .     Discharge instructions/Information to patient and family:   See after visit summary for information provided to patient and family.      Provisions for Follow-Up Care:  See after visit summary for information related to follow-up care and any pertinent home health orders.      Mobility at time of Discharge:   Basic Mobility Inpatient Raw Score: 23  JH-HLM Goal: 7: Walk 25 feet or more  JH-HLM Achieved: 7: Walk 25 feet or more  HLM Goal achieved. Continue to encourage appropriate mobility.     Disposition:   Home    Planned Readmission: none     Discharge Statement:  I spent 35 minutes discharging the patient. This time was spent on the day of discharge. I had direct contact with the patient on the day of discharge. Greater than 50% of the total time was spent examining patient, answering all patient questions, arranging and discussing plan of care with patient as well as directly providing post-discharge instructions.  Additional time then spent on discharge activities.    Discharge Medications:  See after visit summary for reconciled discharge medications provided to patient and/or family.      **Please Note: This note may have been constructed using a voice recognition  system**

## 2023-12-26 NOTE — CASE MANAGEMENT
Case Management Discharge Planning Note    Patient name Luh Chairez  Location /419-01 MRN [id number]  : 1975 Date 2023       Current Admission Date: 2023  Current Admission Diagnosis:Lumbar radiculopathy   Patient Active Problem List    Diagnosis Date Noted    Vitamin B12 deficiency 2023    Elevated TSH 2023    Fall on same level 2023    Leg weakness, bilateral 2023    Lumbar radiculopathy 2023    Low back pain 2023    Smoker 2023    Acute cystitis with hematuria 2023      LOS (days): 3  Geometric Mean LOS (GMLOS) (days):   Days to GMLOS:     OBJECTIVE:  Risk of Unplanned Readmission Score: 6.49         Current admission status: Inpatient   Preferred Pharmacy:   [facility] [address] [facility] [address] [address]  Phone: [phone] Fax: [phone]    Primary Care Provider: REMI Murry    Primary Insurance: WORKERS COMPENSATION  Secondary Insurance:     DISCHARGE DETAILS:    Discharge planning discussed with:: patient        CM contacted family/caregiver?: No- see comments (declined)  Were Treatment Team discharge recommendations reviewed with patient/caregiver?: Yes  Did patient/caregiver verbalize understanding of patient care needs?: Yes  Were patient/caregiver advised of the risks associated with not following Treatment Team discharge recommendations?: Yes              DME Referral Provided  Referral made for DME?: No (patient requesting BSC. explained to her that we can order but it can take days to deliver. she will also likely have to pay then submit to work.  provided her with prices from [employer]. she is going to call [facility] to see if they have in stock)    Other Referral/Resources/Interventions Provided:  Interventions: Other (Specify)  Referral Comments: provided pt with o/p therapy referrals. instructed her to call work and see if they have to set up  since its a comp case or if she can go anywhere.         Treatment Team Recommendation: Home  Discharge Destination Plan:: Home   Patient discharging home today. O/p therapy recommended. Instructed pt to call her workers comp and see if she has to go where they recommend. She states her family found her a BSC to use. Nurse will review AVS, all follow up providers listed.

## 2023-12-26 NOTE — PLAN OF CARE
Problem: PAIN - ADULT  Goal: Verbalizes/displays adequate comfort level or baseline comfort level  Description: Interventions:  - Encourage patient to monitor pain and request assistance  - Assess pain using appropriate pain scale (0-10 pain scale)  - Administer analgesics based on type and severity of pain and evaluate response  - Implement non-pharmacological measures as appropriate and evaluate response  - Consider cultural and social influences on pain and pain management  - Notify physician/advanced practitioner if interventions unsuccessful or patient reports new pain  Outcome: Progressing     Problem: SAFETY ADULT  Goal: Patient will remain free of falls  Description: INTERVENTIONS:  - Educate patient/family on patient safety including physical limitations  - Instruct patient to call for assistance with activity (standby assist)  - Consult OT/PT to assist with strengthening/mobility   - Keep Call bell within reach  - Keep bed low and locked with side rails adjusted as appropriate  - Keep care items and personal belongings within reach  - Initiate and maintain comfort rounds  - Make Fall Risk Sign visible to staff (high fall risk)  - Offer Toileting every 1-2 Hours, in advance of need  - Obtain necessary fall risk management equipment: nonskid footwear  - Apply yellow socks and bracelet for high fall risk patients  Outcome: Progressing  Goal: Maintain or return to baseline ADL function  Description: INTERVENTIONS:  -  Assess patient's ability to carry out ADLs; (min assist)  - Assess/evaluate cause of self-care deficits (fatigue, pain)  - Assess range of motion  - Assess patient's mobility; (standby assist)  - Assess patient's need for assistive devices and provide as appropriate  - Encourage maximum independence but intervene and supervise when necessary  - Involve family in performance of ADLs  - Assess for home care needs following discharge   - Consider OT consult to assist with ADL evaluation and  planning for discharge  - Provide patient education as appropriate  Outcome: Progressing  Goal: Maintains/Returns to pre admission functional level  Description: INTERVENTIONS:  - Perform AM-PAC 6 Click Basic Mobility/ Daily Activity assessment daily.  - Set and communicate daily mobility goal to care team and patient/family/caregiver.   - Collaborate with rehabilitation services on mobility goals if consulted  - Ambulate patient 3-6 times a day  - Out of bed to chair 3 times a day   - Out of bed for meals 3 times a day  - Out of bed for toileting  - Record patient progress and toleration of activity level   Outcome: Progressing     Problem: INFECTION - ADULT  Goal: Absence or prevention of progression during hospitalization  Description: INTERVENTIONS:  - Assess and monitor for signs and symptoms of infection  - Monitor lab/diagnostic results  - Monitor all insertion sites, i.e. indwelling lines  - Administer medications as ordered  - Instruct and encourage patient and family to use good hand hygiene technique  Outcome: Progressing

## 2023-12-27 LAB — BACTERIA UR CULT: NORMAL

## 2023-12-27 NOTE — ASSESSMENT & PLAN NOTE
Treat with methylprednisolone 40 mg IV every 8 hours which was transition to prednisone 60 mg burst x 10 days at discharge  To continue Robaxin 500 mg every 8 hours as needed and meloxicam 15 mg twice daily for 10 days  Outpatient referral to spine/pain management for possible epidural injection  Referral to PT/OT  Red flag signs reviewed with patient who expressed understanding    MRI of the lumbar spine (12/24/2023):  IMPRESSION:     1. Marrow edema in the right L5 pars interarticularis possibly a bone contusion or perhaps subtle spondylolysis although no definite fracture lucency is noted on the recent CT from 2 days ago.. No subluxation.  2. Mild spondylosis most pronounced at L4-5 with a right neural foraminal disc protrusion.

## 2023-12-28 ENCOUNTER — HOSPITAL ENCOUNTER (EMERGENCY)
Facility: HOSPITAL | Age: 48
Discharge: HOME | End: 2023-12-29
Attending: EMERGENCY MEDICINE
Payer: OTHER MISCELLANEOUS

## 2023-12-28 ENCOUNTER — TELEPHONE (OUTPATIENT)
Age: 48
End: 2023-12-28

## 2023-12-28 DIAGNOSIS — M54.50 ACUTE EXACERBATION OF CHRONIC LOW BACK PAIN: Primary | ICD-10-CM

## 2023-12-28 DIAGNOSIS — G89.29 ACUTE EXACERBATION OF CHRONIC LOW BACK PAIN: Primary | ICD-10-CM

## 2023-12-28 PROBLEM — E55.9 VITAMIN D INSUFFICIENCY: Status: ACTIVE | Noted: 2023-12-28

## 2023-12-28 PROCEDURE — 99282 EMERGENCY DEPT VISIT SF MDM: CPT

## 2023-12-28 PROCEDURE — 99284 EMERGENCY DEPT VISIT MOD MDM: CPT | Performed by: EMERGENCY MEDICINE

## 2023-12-28 RX ORDER — MELATONIN
2000 DAILY
Qty: 30 TABLET | Refills: 0 | Status: SHIPPED | OUTPATIENT
Start: 2023-12-28

## 2023-12-28 NOTE — TELEPHONE ENCOUNTER
Caller: Luh RBUNSON    Doctor: Dr Shannon    Reason for call:     workers compensation  Date of Injury:12/20/2023- Body part injury   Employer: [employer]   Co: Unknown  Claim #: [id number]  Address: Unknown   Contact Name: Maria C   Phone #: [phone]  Raf SAMUEL             Call back#: [phone]   [phone]

## 2023-12-28 NOTE — Clinical Note
Luh Chairez was seen and treated in our emergency department on 12/28/2023.    No restrictions            Diagnosis: acute low back pain    Luh  .    She may return on this date: 12/31/2023         If you have any questions or concerns, please don't hesitate to call.      Willie Mcdaniel, DO    ______________________________           _______________          _______________  Hospital Representative                              Date                                Time

## 2023-12-29 VITALS
SYSTOLIC BLOOD PRESSURE: 137 MMHG | HEART RATE: 78 BPM | DIASTOLIC BLOOD PRESSURE: 74 MMHG | RESPIRATION RATE: 18 BRPM | TEMPERATURE: 97.2 F | OXYGEN SATURATION: 97 %

## 2023-12-29 PROCEDURE — 96372 THER/PROPH/DIAG INJ SC/IM: CPT

## 2023-12-29 RX ORDER — OXYCODONE HYDROCHLORIDE 5 MG/1
5 TABLET ORAL ONCE
Status: COMPLETED | OUTPATIENT
Start: 2023-12-29 | End: 2023-12-29

## 2023-12-29 RX ORDER — KETOROLAC TROMETHAMINE 30 MG/ML
15 INJECTION, SOLUTION INTRAMUSCULAR; INTRAVENOUS ONCE
Status: COMPLETED | OUTPATIENT
Start: 2023-12-29 | End: 2023-12-29

## 2023-12-29 RX ORDER — LIDOCAINE 50 MG/G
1 PATCH TOPICAL ONCE
Status: DISCONTINUED | OUTPATIENT
Start: 2023-12-29 | End: 2023-12-29 | Stop reason: HOSPADM

## 2023-12-29 RX ADMIN — LIDOCAINE 5% 1 PATCH: 700 PATCH TOPICAL at 00:41

## 2023-12-29 RX ADMIN — OXYCODONE HYDROCHLORIDE 5 MG: 5 TABLET ORAL at 00:41

## 2023-12-29 RX ADMIN — KETOROLAC TROMETHAMINE 15 MG: 30 INJECTION, SOLUTION INTRAMUSCULAR; INTRAVENOUS at 00:41

## 2023-12-29 NOTE — DISCHARGE INSTRUCTIONS
You have been seen for low back pain. Please continue your previously prescribed pain regimen. Return to the emergency department if you develop weakness/numbness, trouble with urinating or passing bowel movements or any other symptoms of concern. Please follow up with your PCP by calling the number provided.

## 2023-12-29 NOTE — ED PROVIDER NOTES
History  Chief Complaint   Patient presents with    Back Pain     Pt states that she was seen here on the 20th for a back injury and went back to work today and is having increased lower back pain. Complains of legs and feet throbbing.      Luh Chairez is a 48 y.o. year old female with PMH of COPD presenting to the [facility] ED for low back pain. Patient recently evaluated in ED and admitted to hospital for evaluation of low back s/p fall on ice. Patient underwent MRI of lumbar spine which demonstrated vertebral contusion and radiculopathy. Patient discharged two days ago with plan for outpatient f/u with pain management and neurosurgery. Patient returned to work today though reported to have worsening low back discomfort throughout the day. Patient denies associated weakness/numbness in lower extremities. No bowel/bladder incontinence. No urinary retention. No saddle anesthesia. She has had no new falls or trauma to the low back region. The patient has taken muscle relaxers at home for symptomatic treatment. She also reports compliance with previously prescribed meloxicam and course of steroids.      History provided by:  Medical records and patient   used: No    Back Pain  Associated symptoms: no abdominal pain, no chest pain, no dysuria, no fever, no numbness and no weakness        Prior to Admission Medications   Prescriptions Last Dose Informant Patient Reported? Taking?   cholecalciferol (VITAMIN D3) 1,000 units tablet   No No   Sig: Take 2 tablets (2,000 Units total) by mouth daily To treat vitamin D insufficiency   lidocaine (Lidoderm) 5 %   No No   Sig: Apply 1 patch topically over 12 hours daily Remove & Discard patch within 12 hours or as directed by MD   meloxicam (Mobic) 7.5 mg tablet   No No   Sig: Take 2 tablets (15 mg total) by mouth daily for 10 days   methocarbamol (ROBAXIN) 500 mg tablet   No No   Sig: Take 2 tablets (1,000 mg total) by mouth daily at bedtime as  needed for muscle spasms   predniSONE 20 mg tablet   No No   Sig: Take 3 tablets (60 mg total) by mouth daily for 5 days      Facility-Administered Medications: None       Past Medical History:   Diagnosis Date    COPD (chronic obstructive pulmonary disease) (HCC)        Past Surgical History:   Procedure Laterality Date    APPENDECTOMY      HERNIA REPAIR         History reviewed. No pertinent family history.  I have reviewed and agree with the history as documented.    E-Cigarette/Vaping     E-Cigarette/Vaping Substances     Social History     Tobacco Use    Smoking status: Every Day     Current packs/day: 2.00     Types: Cigarettes    Smokeless tobacco: Never   Substance Use Topics    Alcohol use: Not Currently     Comment: social drinker    Drug use: Never       Review of Systems   Constitutional:  Negative for fever.   Respiratory:  Negative for shortness of breath.    Cardiovascular:  Negative for chest pain.   Gastrointestinal:  Negative for abdominal pain, constipation, diarrhea, nausea and vomiting.   Genitourinary:  Negative for difficulty urinating, dysuria and flank pain.   Musculoskeletal:  Positive for back pain and myalgias.   Neurological:  Negative for weakness and numbness.   All other systems reviewed and are negative.      Physical Exam  Physical Exam  Vitals and nursing note reviewed.   Constitutional:       General: She is not in acute distress.     Appearance: Normal appearance. She is well-developed. She is not ill-appearing, toxic-appearing or diaphoretic.   HENT:      Head: Normocephalic and atraumatic.      Nose: No congestion or rhinorrhea.   Eyes:      General:         Right eye: No discharge.         Left eye: No discharge.   Cardiovascular:      Rate and Rhythm: Normal rate and regular rhythm.   Pulmonary:      Effort: Pulmonary effort is normal. No accessory muscle usage or respiratory distress.      Breath sounds: Normal breath sounds. No stridor. No decreased breath sounds, wheezing,  rhonchi or rales.   Abdominal:      General: Bowel sounds are normal. There is no distension.      Palpations: Abdomen is soft.      Tenderness: There is no abdominal tenderness. There is no guarding or rebound.   Musculoskeletal:      Cervical back: Normal range of motion and neck supple.      Lumbar back: Tenderness (global low back discomfort) present. No bony tenderness.        Back:       Right lower leg: No tenderness. No edema.      Left lower leg: No tenderness. No edema.   Skin:     Capillary Refill: Capillary refill takes less than 2 seconds.      Findings: No rash.   Neurological:      Mental Status: She is alert and oriented to person, place, and time.      GCS: GCS eye subscore is 4. GCS verbal subscore is 5. GCS motor subscore is 6.      Sensory: Sensation is intact.      Motor: Motor function is intact.      Comments: 5/5 strength b/l LE.  5/5 plantar/dorsiflexion b/l LE.  Sensation grossly intact throughout.   Psychiatric:         Mood and Affect: Mood normal.         Behavior: Behavior normal.         Vital Signs  ED Triage Vitals [12/29/23 0003]   Temperature Pulse Respirations Blood Pressure SpO2   (!) 97.2 °F (36.2 °C) 78 18 137/74 97 %      Temp Source Heart Rate Source Patient Position - Orthostatic VS BP Location FiO2 (%)   Temporal Monitor Lying Right arm --      Pain Score       9           Vitals:    12/29/23 0003   BP: 137/74   Pulse: 78   Patient Position - Orthostatic VS: Lying         Visual Acuity      ED Medications  Medications   lidocaine (LIDODERM) 5 % patch 1 patch (1 patch Topical Medication Applied 12/29/23 0041)   ketorolac (TORADOL) injection 15 mg (15 mg Intramuscular Given 12/29/23 0041)   oxyCODONE (ROXICODONE) IR tablet 5 mg (5 mg Oral Given 12/29/23 0041)       Diagnostic Studies  Results Reviewed       None                   No orders to display              Procedures  Procedures         ED Course  ED Course as of 12/29/23 0238   Fri Dec 29, 2023   0128 Patient  "reassessed. She is feeling \"much better\". Continues to deny weakness/numbness in lower extremities. Will discharge at this time.                               SBIRT 20yo+      Flowsheet Row Most Recent Value   Initial Alcohol Screen: US AUDIT-C     1. How often do you have a drink containing alcohol? 0 Filed at: 12/29/2023 0003   2. How many drinks containing alcohol do you have on a typical day you are drinking?  0 Filed at: 12/29/2023 0003   3a. Male UNDER 65: How often do you have five or more drinks on one occasion? 0 Filed at: 12/29/2023 0003   3b. FEMALE Any Age, or MALE 65+: How often do you have 4 or more drinks on one occassion? 0 Filed at: 12/29/2023 0003   Audit-C Score 0 Filed at: 12/29/2023 0003   JAXON: How many times in the past year have you...    Used an illegal drug or used a prescription medication for non-medical reasons? Never Filed at: 12/29/2023 0003                      Medical Decision Making    48 y.o. female presenting for evaluation of low back discomfort.  Chart reviewed, patient recently admitted for same issue at which time MRI of lumbar spine performed demonstrating vertebral contusion and neural foraminal narrowing.  Patient denying neurologic complaints or bowel/bladder incontinence. As such patient agreeable to forgo additional imaging at this time and treat supportively.  Will treat symptomatically.    Reassessment: pain improved. Patient continued top deny neurologic complaints in LE. Ambulatory in ED without assistance to bathroom.    Disposition: I have discussed with the patient our plan to discharge them from the ED and the patient is in agreement with this plan.     Discharge Plan: Continue previously prescribed meloxicam, steriods and muscle relaxers. RTED precautions emphasized. The patient was provided a written after visit summary with strict RTED precautions.     Followup: I have discussed with the patient plan to follow up with their PCP. Contact information provided in " AVS.    Risk  Prescription drug management.             Disposition  Final diagnoses:   Acute exacerbation of chronic low back pain     Time reflects when diagnosis was documented in both MDM as applicable and the Disposition within this note       Time User Action Codes Description Comment    12/29/2023  1:29 AM Mcdaniel Willie Shayna [M54.50,  G89.29] Acute exacerbation of chronic low back pain           ED Disposition       ED Disposition   Discharge    Condition   Stable    Date/Time   Fri Dec 29, 2023 0128    Comment   Luh Chairez discharge to home/self care.                   Follow-up Information       Follow up With Specialties Details Why Contact Info    REMI Murry Nurse Practitioner Schedule an appointment as soon as possible for a visit  To make appointment for reevaluation in 3-5 days. [address] [place] [address]  [phone]              Discharge Medication List as of 12/29/2023  1:29 AM        CONTINUE these medications which have NOT CHANGED    Details   cholecalciferol (VITAMIN D3) 1,000 units tablet Take 2 tablets (2,000 Units total) by mouth daily To treat vitamin D insufficiency, Starting Thu 12/28/2023, Normal      lidocaine (Lidoderm) 5 % Apply 1 patch topically over 12 hours daily Remove & Discard patch within 12 hours or as directed by MD, Starting Thu 12/21/2023, Normal      meloxicam (Mobic) 7.5 mg tablet Take 2 tablets (15 mg total) by mouth daily for 10 days, Starting Tue 12/26/2023, Until Fri 1/5/2024, Normal      methocarbamol (ROBAXIN) 500 mg tablet Take 2 tablets (1,000 mg total) by mouth daily at bedtime as needed for muscle spasms, Starting Thu 12/21/2023, Normal      predniSONE 20 mg tablet Take 3 tablets (60 mg total) by mouth daily for 5 days, Starting Tue 12/26/2023, Until Sun 12/31/2023, Normal             No discharge procedures on file.    PDMP Review         Value Time User    PDMP Reviewed  Yes 12/22/2023  9:04 PM Laurent Castillo PA-C            ED  Provider  Electronically Signed by             Willie Mcdaniel, DO  12/29/23 0236

## 2024-01-01 ENCOUNTER — HOSPITAL ENCOUNTER (EMERGENCY)
Facility: HOSPITAL | Age: 49
Discharge: HOME | End: 2024-01-01
Attending: EMERGENCY MEDICINE
Payer: OTHER MISCELLANEOUS

## 2024-01-01 VITALS
HEIGHT: 64 IN | TEMPERATURE: 96.9 F | RESPIRATION RATE: 16 BRPM | OXYGEN SATURATION: 97 % | SYSTOLIC BLOOD PRESSURE: 132 MMHG | DIASTOLIC BLOOD PRESSURE: 70 MMHG | HEART RATE: 81 BPM | BODY MASS INDEX: 30.39 KG/M2 | WEIGHT: 178 LBS

## 2024-01-01 DIAGNOSIS — M54.50 ACUTE EXACERBATION OF CHRONIC LOW BACK PAIN: Primary | ICD-10-CM

## 2024-01-01 DIAGNOSIS — G89.29 ACUTE EXACERBATION OF CHRONIC LOW BACK PAIN: Primary | ICD-10-CM

## 2024-01-01 PROCEDURE — 99283 EMERGENCY DEPT VISIT LOW MDM: CPT

## 2024-01-01 PROCEDURE — 99284 EMERGENCY DEPT VISIT MOD MDM: CPT | Performed by: PHYSICIAN ASSISTANT

## 2024-01-01 PROCEDURE — 96372 THER/PROPH/DIAG INJ SC/IM: CPT

## 2024-01-01 RX ORDER — ACETAMINOPHEN 325 MG/1
975 TABLET ORAL ONCE
Status: COMPLETED | OUTPATIENT
Start: 2024-01-01 | End: 2024-01-01

## 2024-01-01 RX ORDER — METHOCARBAMOL 500 MG/1
500 TABLET, FILM COATED ORAL 2 TIMES DAILY
Qty: 20 TABLET | Refills: 0 | Status: SHIPPED | OUTPATIENT
Start: 2024-01-01

## 2024-01-01 RX ORDER — METHOCARBAMOL 500 MG/1
500 TABLET, FILM COATED ORAL ONCE
Status: COMPLETED | OUTPATIENT
Start: 2024-01-01 | End: 2024-01-01

## 2024-01-01 RX ORDER — KETOROLAC TROMETHAMINE 30 MG/ML
15 INJECTION, SOLUTION INTRAMUSCULAR; INTRAVENOUS ONCE
Status: COMPLETED | OUTPATIENT
Start: 2024-01-01 | End: 2024-01-01

## 2024-01-01 RX ORDER — NAPROXEN 500 MG/1
500 TABLET ORAL 2 TIMES DAILY WITH MEALS
Qty: 30 TABLET | Refills: 0 | Status: SHIPPED | OUTPATIENT
Start: 2024-01-01

## 2024-01-01 RX ADMIN — METHOCARBAMOL TABLETS 500 MG: 500 TABLET, COATED ORAL at 15:52

## 2024-01-01 RX ADMIN — KETOROLAC TROMETHAMINE 15 MG: 30 INJECTION, SOLUTION INTRAMUSCULAR at 15:52

## 2024-01-01 RX ADMIN — ACETAMINOPHEN 975 MG: 325 TABLET, FILM COATED ORAL at 15:52

## 2024-01-01 NOTE — ED PROVIDER NOTES
"History  Chief Complaint   Patient presents with    Back Pain     Pt repots mid lower back pain since 12/20.      Patient is a 48-year-old female with a PMHx of COPD, presenting to the ED for evaluation of acute on chronic low back pain.  Patient reports ongoing low back pain since a fall that occurred on 12/20/23 after she slipped on a patch of ice. She was seen and evaluated in the ED on 12/21 and had an x-ray of the lumbar spine that was normal. She returned to the ED on 12/22 for persistent pain and had a CT that showed \"moderate right foraminal disc protrusion suggested at L4-5 with associated moderate ipsilateral foraminal stenosis but no discernible compressive neuropathy\". She was then admitted to the hospital for pain control and had an MRI of the lumbar spine that showed questionable marrow edema in the right L5 pars representing questionable bone contusion versus spondylolysis. She was discharged on steroids, meloxicam and Robaxin which she says she has completed. She states that she tried to be more active today and do some light work around the house which exacerbated her low back pain.  The pain is consistent with the pain that she has been experiencing since the fall and she denies any new symptoms.  She denies any new numbness/weakness lower extremities, bowel/bladder incontinence, urinary retention or saddle anesthesia.  She has been ambulating without assistance.  She denies any fevers, chills, flank pain or urinary symptoms.  She states that she was supposed to return to work today but does not feel that she is able to do this secondary to her back pain.  She states that she is scheduled to see her PCP for follow-up on 1/12 and with pain management on 1/22.       MRI of the lumbar spine (12/24/2023):   \"1. Marrow edema in the right L5 pars interarticularis possibly a bone contusion or perhaps subtle spondylolysis although no definite fracture lucency is noted on the recent CT from 2 days ago.. No " "subluxation.  2. Mild spondylosis most pronounced at L4-5 with a right neural foraminal disc protrusion.\"          Prior to Admission Medications   Prescriptions Last Dose Informant Patient Reported? Taking?   cholecalciferol (VITAMIN D3) 1,000 units tablet   No No   Sig: Take 2 tablets (2,000 Units total) by mouth daily To treat vitamin D insufficiency   lidocaine (Lidoderm) 5 %   No No   Sig: Apply 1 patch topically over 12 hours daily Remove & Discard patch within 12 hours or as directed by MD   meloxicam (Mobic) 7.5 mg tablet   No No   Sig: Take 2 tablets (15 mg total) by mouth daily for 10 days   methocarbamol (ROBAXIN) 500 mg tablet   No No   Sig: Take 2 tablets (1,000 mg total) by mouth daily at bedtime as needed for muscle spasms   predniSONE 20 mg tablet   No No   Sig: Take 3 tablets (60 mg total) by mouth daily for 5 days      Facility-Administered Medications: None       Past Medical History:   Diagnosis Date    COPD (chronic obstructive pulmonary disease) (HCC)        Past Surgical History:   Procedure Laterality Date    APPENDECTOMY      GALLBLADDER SURGERY      HERNIA REPAIR      REMOVAL BLADDER STIMULATOR      TUBAL LIGATION         History reviewed. No pertinent family history.  I have reviewed and agree with the history as documented.    E-Cigarette/Vaping     E-Cigarette/Vaping Substances     Social History     Tobacco Use    Smoking status: Former     Current packs/day: 2.00     Types: Cigarettes    Smokeless tobacco: Never   Substance Use Topics    Alcohol use: Not Currently     Comment: social drinker    Drug use: Never       Review of Systems   Constitutional:  Negative for chills and fever.   HENT:  Negative for ear pain and sore throat.    Eyes:  Negative for pain and visual disturbance.   Respiratory:  Negative for cough and shortness of breath.    Cardiovascular:  Negative for chest pain and palpitations.   Gastrointestinal:  Negative for abdominal pain and vomiting.   Genitourinary:  " Negative for dysuria and hematuria.   Musculoskeletal:  Positive for back pain. Negative for arthralgias, myalgias and neck pain.   Skin:  Negative for color change and rash.   Neurological:  Negative for dizziness, seizures, syncope and weakness.   All other systems reviewed and are negative.      Physical Exam  Physical Exam  Vitals and nursing note reviewed.   Constitutional:       General: She is awake.      Appearance: Normal appearance. She is well-developed. She is not toxic-appearing or diaphoretic.   HENT:      Head: Normocephalic and atraumatic.      Right Ear: External ear normal.      Left Ear: External ear normal.      Nose: Nose normal.      Mouth/Throat:      Lips: Pink.      Mouth: Mucous membranes are moist.   Eyes:      General: Lids are normal. No scleral icterus.     Conjunctiva/sclera: Conjunctivae normal.      Pupils: Pupils are equal, round, and reactive to light.   Cardiovascular:      Rate and Rhythm: Normal rate and regular rhythm.      Pulses: Normal pulses.           Radial pulses are 2+ on the right side and 2+ on the left side.      Heart sounds: Normal heart sounds, S1 normal and S2 normal.   Pulmonary:      Effort: Pulmonary effort is normal. No accessory muscle usage.      Breath sounds: Normal breath sounds. No stridor. No decreased breath sounds, wheezing, rhonchi or rales.   Abdominal:      General: Abdomen is flat. Bowel sounds are normal. There is no distension.      Palpations: Abdomen is soft.      Tenderness: There is no abdominal tenderness. There is no right CVA tenderness, left CVA tenderness, guarding or rebound.   Musculoskeletal:      Cervical back: Full passive range of motion without pain and neck supple. No signs of trauma. No pain with movement.      Right lower leg: No edema.      Left lower leg: No edema.      Comments: Tenderness to palpation of bilateral lumbar paraspinal muscles with midline tenderness in the lumbosacral region.   No deformities or step-offs.  Strength 5/5 in bilateral lower extremities. Sensation intact. PT/DP pulses 2+. Patellar reflexes 2+ bilaterally.       Lymphadenopathy:      Cervical: No cervical adenopathy.   Skin:     General: Skin is warm and dry.      Capillary Refill: Capillary refill takes less than 2 seconds.      Coloration: Skin is not cyanotic, jaundiced or pale.   Neurological:      Mental Status: She is alert and oriented to person, place, and time.      GCS: GCS eye subscore is 4. GCS verbal subscore is 5. GCS motor subscore is 6.      Gait: Gait normal.   Psychiatric:         Mood and Affect: Mood normal.         Speech: Speech normal.         Behavior: Behavior is cooperative.         Vital Signs  ED Triage Vitals   Temperature Pulse Respirations Blood Pressure SpO2   01/01/24 1431 01/01/24 1430 01/01/24 1431 01/01/24 1430 01/01/24 1430   (!) 96.9 °F (36.1 °C) 86 16 146/68 97 %      Temp Source Heart Rate Source Patient Position - Orthostatic VS BP Location FiO2 (%)   01/01/24 1431 01/01/24 1431 01/01/24 1431 01/01/24 1431 --   Temporal Monitor Sitting Right arm       Pain Score       01/01/24 1431       8           Vitals:    01/01/24 1430 01/01/24 1431 01/01/24 1500 01/01/24 1530   BP: 146/68 146/68  132/70   Pulse: 86 87 84 81   Patient Position - Orthostatic VS:  Sitting  Lying         Visual Acuity      ED Medications  Medications   ketorolac (TORADOL) injection 15 mg (15 mg Intramuscular Given 1/1/24 1552)   acetaminophen (TYLENOL) tablet 975 mg (975 mg Oral Given 1/1/24 1552)   methocarbamol (ROBAXIN) tablet 500 mg (500 mg Oral Given 1/1/24 1552)       Diagnostic Studies  Results Reviewed       None                   No orders to display              Procedures  Procedures         ED Course                               SBIRT 20yo+      Flowsheet Row Most Recent Value   Initial Alcohol Screen: US AUDIT-C     1. How often do you have a drink containing alcohol? 0 Filed at: 01/01/2024 1431   2. How many drinks containing  alcohol do you have on a typical day you are drinking?  0 Filed at: 01/01/2024 1431   3a. Male UNDER 65: How often do you have five or more drinks on one occasion? 0 Filed at: 01/01/2024 1431   3b. FEMALE Any Age, or MALE 65+: How often do you have 4 or more drinks on one occassion? 0 Filed at: 01/01/2024 1431   Audit-C Score 0 Filed at: 01/01/2024 1431   JAXON: How many times in the past year have you...    Used an illegal drug or used a prescription medication for non-medical reasons? Never Filed at: 01/01/2024 1431                      Medical Decision Making  Patient is a 48-year-old female with a PMHx of COPD, presenting to the ED for evaluation of acute on chronic low back pain.      Patient has no red flag symptoms of spinal cord compression at this time.  She has no new symptoms or new injuries to warrant any repeat or emergent imaging at this time.  She was able ambulate in the ED without assistance.  She reports some improvement of pain with Toradol, Tylenol and Robaxin.  Will send prescriptions for naproxen and Robaxin to patient's pharmacy.  She has upcoming appointments with her PCP and with management.  She was given a work note for the next few days and advised to follow-up with her PCP should she need any extended time off of work. We discussed the symptoms which are most concerning (saddle anesthesia, urinary or bowel incontinence or retention, changing or worsening pain) that necessitate immediate return.     The management plan was discussed in detail with the patient at bedside and all questions were answered. Strict ED return instructions were discussed at bedside. Prior to discharge, both verbal and written instructions were provided. We discussed the signs and symptoms that should prompt the patient to return to the ED. All questions were answered and the patient was comfortable with the plan of care and discharged home. The patient agrees to return to the Emergency Department for concerns  and/or progression of illness.     Risk  OTC drugs.  Prescription drug management.             Disposition  Final diagnoses:   Acute exacerbation of chronic low back pain     Time reflects when diagnosis was documented in both MDM as applicable and the Disposition within this note       Time User Action Codes Description Comment    1/1/2024  4:18 PM Mary Cagle Add [M54.50,  G89.29] Acute exacerbation of chronic low back pain           ED Disposition       ED Disposition   Discharge    Condition   Stable    Date/Time   Mon Jan 1, 2024 1618    Comment   Luh Chairez discharge to home/self care.                   Follow-up Information       Follow up With Specialties Details Why Contact Info Additional Information    [facility] Physical Therapy Schedule an appointment as soon as possible for a visit   [phone]    REMI Murry Nurse Practitioner Schedule an appointment as soon as possible for a visit   [address] [place] [address]  [phone]       [facility] Emergency Department Emergency Medicine  If symptoms worsen [address] [facility] [address]  [phone] [facility] Emergency Department, [address], [place], [address]            Discharge Medication List as of 1/1/2024  4:23 PM        START taking these medications    Details   !! methocarbamol (ROBAXIN) 500 mg tablet Take 1 tablet (500 mg total) by mouth 2 (two) times a day, Starting Mon 1/1/2024, Normal      naproxen (Naprosyn) 500 mg tablet Take 1 tablet (500 mg total) by mouth 2 (two) times a day with meals, Starting Mon 1/1/2024, Normal       !! - Potential duplicate medications found. Please discuss with provider.        CONTINUE these medications which have NOT CHANGED    Details   cholecalciferol (VITAMIN D3) 1,000 units tablet Take 2 tablets (2,000 Units total) by mouth daily To treat vitamin D insufficiency,  Starting Thu 12/28/2023, Normal      lidocaine (Lidoderm) 5 % Apply 1 patch topically over 12 hours daily Remove & Discard patch within 12 hours or as directed by MD, Starting Thu 12/21/2023, Normal      meloxicam (Mobic) 7.5 mg tablet Take 2 tablets (15 mg total) by mouth daily for 10 days, Starting Tue 12/26/2023, Until Fri 1/5/2024, Normal      !! methocarbamol (ROBAXIN) 500 mg tablet Take 2 tablets (1,000 mg total) by mouth daily at bedtime as needed for muscle spasms, Starting Thu 12/21/2023, Normal       !! - Potential duplicate medications found. Please discuss with provider.        STOP taking these medications       predniSONE 20 mg tablet Comments:   Reason for Stopping:                   PDMP Review         Value Time User    PDMP Reviewed  Yes 12/22/2023  9:04 PM Laurent Castillo PA-C            ED Provider  Electronically Signed by             Mary Cagle PA-C  01/01/24 2010

## 2024-01-01 NOTE — ED NOTES
Pt  verbalizes feeling better. Discharged home with instructions     Julissa Lobo RN  01/01/24 4057

## 2024-01-01 NOTE — Clinical Note
Luh Chairez was seen and treated in our emergency department on 1/1/2024.    ?    ?    Light duty (no lifting >10 lbs, no bending/twisting, etc) until cleared by PCP.    Diagnosis: ?    Luh  may return to work on return date.    She may return on this date: 01/04/2024    Patient may return to work on Thursday, 1/4/2023.      If you have any questions or concerns, please don't hesitate to call.      Mary Cagle PA-C    ______________________________           _______________          _______________  Hospital Representative                              Date                                Time

## 2024-01-01 NOTE — Clinical Note
Luh Chairez was seen and treated in our emergency department on 1/1/2024.            Light duty only (no lifting >10 lbs, no bending/twisting, etc) until cleared by PCP.    Diagnosis:     Luh  may return to work on return date.    She may return on this date: 01/04/2024         If you have any questions or concerns, please don't hesitate to call.      Mary Cagle PA-C    ______________________________           _______________          _______________  Hospital Representative                              Date                                Time
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
## 2024-01-12 ENCOUNTER — CONSULT (OUTPATIENT)
Dept: PAIN MEDICINE | Facility: CLINIC | Age: 49
End: 2024-01-12
Payer: MEDICARE

## 2024-01-12 VITALS
BODY MASS INDEX: 31.76 KG/M2 | HEIGHT: 64 IN | WEIGHT: 186 LBS | DIASTOLIC BLOOD PRESSURE: 71 MMHG | HEART RATE: 87 BPM | SYSTOLIC BLOOD PRESSURE: 103 MMHG

## 2024-01-12 DIAGNOSIS — M54.40 ACUTE BILATERAL LOW BACK PAIN WITH SCIATICA, SCIATICA LATERALITY UNSPECIFIED: Primary | ICD-10-CM

## 2024-01-12 DIAGNOSIS — M51.36 DDD (DEGENERATIVE DISC DISEASE), LUMBAR: ICD-10-CM

## 2024-01-12 PROCEDURE — 99204 OFFICE O/P NEW MOD 45 MIN: CPT | Performed by: STUDENT IN AN ORGANIZED HEALTH CARE EDUCATION/TRAINING PROGRAM

## 2024-01-12 RX ORDER — TRAMADOL HYDROCHLORIDE 50 MG/1
50 TABLET ORAL EVERY 6 HOURS PRN
COMMUNITY

## 2024-01-12 NOTE — PROGRESS NOTES
"Assessment:  1. Acute bilateral low back pain with sciatica, sciatica laterality unspecified    2. DDD (degenerative disc disease), lumbar      Portions of the record may have been created with voice recognition software. Occasional wrong word or \"sound a like\" substitutions may have occurred due to the inherent limitations of voice recognition software. Read the chart carefully and recognize, using context, where substitutions have occurred. Contact me with any questions.     Plan:  48-year-old female referred by Mary Feliciano PA-C, here for initial evaluation of acute bilateral low back pain with intermittent radiation into left greater than right LE since a fall on ice on 12/20/2023.  Patient has been to the ER several times for the symptoms recently.  Initially, she was also experiencing neck and UE symptoms after the fall, but these have since resolved.  Recent lumbar spine MRI reviewed and shows mild spondylosis, facet hypertrophy.  MRI also shows marrow edema and right L5 pars interarticularis, possible bone contusion.  Patient was evaluated by neurosurgery during recent hospital stay for the symptoms and no surgical intervention was recommended.  She denies new or progressive weakness, saddle anesthesia, bowel/bladder incontinence.  Today, patient notes gradual improvement in symptoms with steroid taper, tramadol 50 mg as needed as prescribed by PCP.  Expanse and distribution of current symptoms does not directly correlate with MRI findings.    Patient hesitant to pursue interventional treatment due to prior poor response to spinal interventions which patient notes were performed to treat dx of meralgia paresthetica.    Discussed option of initiating gabapentin for symptom management, patient defers due to prior adverse effects w/ this medication.    Would recommend course of PT for optimizing function and ambulation as acute pain symptoms subside.      History of Present Illness:    The patient " is a 48 y.o. female who presents for consultation in regards to Back Pain, Leg Pain, Knee Pain, and Hip Pain.  Symptoms have been present since Dec 20 '23. Symptoms began after fall on ice. Pain is reported to be 7 on the numeric rating scale.  Symptoms are felt nearly constantly and worst in the no typical pattern.  Symptoms are characterized as burning, cramping, shooting, sharp, numbing, pressure-like, and throbbing.  Symptoms are associated with pain limited ambulation and function.  Aggravating factors include most activity.  Relieving factors include sitting.  No change in symptoms with lying down and relaxation.       Review of Systems:    Review of Systems   Constitutional:  Negative for chills and fever.   HENT:  Negative for ear pain and sore throat.    Eyes:  Negative for pain and visual disturbance.   Respiratory:  Negative for cough and shortness of breath.    Cardiovascular:  Negative for chest pain and palpitations.   Gastrointestinal:  Positive for abdominal pain and nausea. Negative for vomiting.   Genitourinary:  Positive for frequency. Negative for dysuria and hematuria.   Musculoskeletal:  Positive for arthralgias and myalgias. Negative for back pain.   Skin:  Negative for color change and rash.   Neurological:  Positive for numbness. Negative for seizures and syncope.   Psychiatric/Behavioral:  Positive for decreased concentration.    All other systems reviewed and are negative.        Past Medical History:   Diagnosis Date    COPD (chronic obstructive pulmonary disease) (HCC)        Past Surgical History:   Procedure Laterality Date    APPENDECTOMY      GALLBLADDER SURGERY      HERNIA REPAIR      REMOVAL BLADDER STIMULATOR      TUBAL LIGATION         History reviewed. No pertinent family history.    Social History     Occupational History    Not on file   Tobacco Use    Smoking status: Former     Current packs/day: 2.00     Types: Cigarettes    Smokeless tobacco: Never   Vaping Use    Vaping  "status: Every Day    Substances: Nicotine, Flavoring   Substance and Sexual Activity    Alcohol use: Not Currently     Comment: social drinker    Drug use: Never    Sexual activity: Not on file         Current Outpatient Medications:     cholecalciferol (VITAMIN D3) 1,000 units tablet, Take 2 tablets (2,000 Units total) by mouth daily To treat vitamin D insufficiency, Disp: 30 tablet, Rfl: 0    lidocaine (Lidoderm) 5 %, Apply 1 patch topically over 12 hours daily Remove & Discard patch within 12 hours or as directed by MD, Disp: 10 patch, Rfl: 0    methocarbamol (ROBAXIN) 500 mg tablet, Take 2 tablets (1,000 mg total) by mouth daily at bedtime as needed for muscle spasms, Disp: 7 tablet, Rfl: 0    methocarbamol (ROBAXIN) 500 mg tablet, Take 1 tablet (500 mg total) by mouth 2 (two) times a day, Disp: 20 tablet, Rfl: 0    traMADol (ULTRAM) 50 mg tablet, Take 50 mg by mouth every 6 (six) hours as needed for moderate pain, Disp: , Rfl:     meloxicam (Mobic) 7.5 mg tablet, Take 2 tablets (15 mg total) by mouth daily for 10 days, Disp: 20 tablet, Rfl: 0    naproxen (Naprosyn) 500 mg tablet, Take 1 tablet (500 mg total) by mouth 2 (two) times a day with meals (Patient not taking: Reported on 1/12/2024), Disp: 30 tablet, Rfl: 0    Allergies   Allergen Reactions    Hydrocodone-Acetaminophen Hives    Oxycodone-Acetaminophen GI Intolerance    Food Rash     Blue berries    Vaccinium Angustifolium Hives and Rash       Physical Exam:    /71 (BP Location: Left arm, Patient Position: Sitting, Cuff Size: Standard)   Pulse 87   Ht 5' 4\" (1.626 m)   Wt 84.4 kg (186 lb)   LMP 12/22/2023 (Approximate)   BMI 31.93 kg/m²     Constitutional: normal, well developed, well nourished, alert, in no distress and non-toxic and no overt pain behavior.  Eyes: anicteric  HEENT: grossly intact  Neck: supple, symmetric, trachea midline and no masses   Pulmonary:even and unlabored  Cardiovascular:No edema or pitting edema " present  Skin:Normal without rashes or lesions and well hydrated  Psychiatric:Mood and affect appropriate  Neurologic:Cranial Nerves II-XII grossly intact  Musculoskeletal:normal gait, discomfort to palpation over b/l lumbar paraspinals, grossly intact strength and sensation to light touch over BLE      Imaging  Narrative & Impression   MRI LUMBAR SPINE WITHOUT CONTRAST     INDICATION: fall, paraesthesia.     COMPARISON: 12/21/2023; 12/22/2023     TECHNIQUE:  Multiplanar, multisequence imaging of the lumbar spine was performed. .        IMAGE QUALITY:  Diagnostic     FINDINGS:     VERTEBRAL BODIES:  There are 5 lumbar type vertebral bodies.  Normal alignment of the lumbar spine.  No spondylolysis or spondylolisthesis. No scoliosis. Mild marrow edema in the right L5 pars interarticularis, series 5 image 18.    Minimal levoscoliosis   mid lumbar spine centered at the L3 level. Small hemangiomata noted in L4 and L5.     SACRUM:  Normal signal within the sacrum. No evidence of insufficiency or stress fracture.     DISTAL CORD AND CONUS:  Normal size and signal within the distal cord and conus. The conus medullaris terminates at the L1-2 intervertebral disc space.     PARASPINAL SOFT TISSUES:  Paraspinal soft tissues are unremarkable.     LOWER THORACIC DISC SPACES:  Normal disc height and signal.  No disc herniation, canal stenosis or foraminal narrowing.     LUMBAR DISC SPACES:     L1-L2:  Normal.     L2-L3:  Normal.     L3-L4: There is no focal disk herniation, central canal or neural foraminal narrowing.  Bilateral facet hypertrophy noted.     L4-L5: There is bilateral facet hypertrophy. There is a right neural foraminal disc protrusion. Mild to moderate right neural foraminal narrowing. Central canal and left neural foramen patent.     L5-S1: There is a diffuse disk bulge.  No significant central canal or neural foraminal narrowing.  Bilateral facet hypertrophy noted.     OTHER FINDINGS:  None.     IMPRESSION:         1. Marrow edema in the right L5 pars interarticularis possibly a bone contusion or perhaps subtle spondylolysis although no definite fracture lucency is noted on the recent CT from 2 days ago.. No subluxation.  2. Mild spondylosis most pronounced at L4-5 with a right neural foraminal disc protrusion.         CT BRAIN - WITHOUT CONTRAST     INDICATION:   headache fall.     COMPARISON:  None.     TECHNIQUE:  CT examination of the brain was performed.  Multiplanar 2D reformatted images were created from the source data.     Radiation dose length product (DLP) for this visit:  905.53 mGy-cm .  This examination, like all CT scans performed in the [facility], was performed utilizing techniques to minimize radiation dose exposure, including the use of iterative   reconstruction and automated exposure control.     IMAGE QUALITY:  Diagnostic.     FINDINGS:     PARENCHYMA:  No intracranial mass, mass effect or midline shift. No CT signs of acute infarction.  No acute parenchymal hemorrhage.     VENTRICLES AND EXTRA-AXIAL SPACES:  Normal for the patient's age.     VISUALIZED ORBITS: Normal visualized orbits.     PARANASAL SINUSES: Normal visualized paranasal sinuses.     CALVARIUM AND EXTRACRANIAL SOFT TISSUES: The patient is edentulous. Metallic foreign body in the right nostril may represent a body piercing. Direct clinical exam advised.     IMPRESSION:     No intracranial hemorrhage or calvarial fracture.         CT CERVICAL SPINE - WITHOUT CONTRAST     INDICATION:   fall 12/20 numbness to fingertips.     COMPARISON:  None.     TECHNIQUE:  CT examination of the cervical spine was performed without intravenous contrast.  Contiguous axial images were obtained. Multiplanar 2D reformatted images were created from the source data.     Radiation dose length product (DLP) for this visit:  569.62 mGy-cm .  This examination, like all CT scans performed in the [facility], was performed utilizing  techniques to minimize radiation dose exposure, including the use of iterative   reconstruction and automated exposure control.     IMAGE QUALITY:  Diagnostic.     FINDINGS:     ALIGNMENT:  Normal alignment of the cervical spine. No subluxation.     VERTEBRAE:  No fracture.     DEGENERATIVE CHANGES: Mild to moderate spondylotic changes noted most pronounced at C5-C6 where there is a disc osteophyte complex. Spondylotic changes are most pronounced C5-C6 where there is a disc osteophyte complex with a superimposed central disc   protrusion causing mild to moderate central canal narrowing and moderate narrowing left subarticular zone.     PREVERTEBRAL AND PARASPINAL SOFT TISSUES: Unremarkable     THORACIC INLET:  Normal.     IMPRESSION:     No cervical spine fracture or traumatic malalignment.     CT CHEST, ABDOMEN AND PELVIS WITH IV CONTRAST     INDICATION:  Fall 12/21 onto hands and knee increased low back pain, rectal pressure and numbness anterior thighs.  COMPARISON:  None     TECHNIQUE: CT examination of the chest, abdomen and pelvis was performed. Multiplanar 2D reformatted images were created from the source data. Coronal MIP images of the chest were also provided.     This examination, like all CT scans performed in the [facility], was performed utilizing techniques to minimize radiation dose exposure, including the use of iterative reconstruction and automated exposure control. Radiation dose length   product (DLP) for this visit:  649.4 mGy-cm     IV Contrast:  100 mL of iohexol (OMNIPAQUE)  Enteric Contrast: Enteric contrast was not administered.     FINDINGS:     CHEST     LUNGS: Mild dependent hypoventilatory changes. No consolidation.     Minimally prominent vascular confluence versus 3 mm nodule left lower pulmonary lobe (series 3/91).     AIRWAYS: No significant filling defect.     PLEURA:  Unremarkable.     HEART/GREAT VESSELS: Heart is unremarkable for patient's age.  No thoracic  aortic aneurysm.     MEDIASTINUM AND ADRIEL:  Unremarkable.     CHEST WALL AND LOWER NECK:  Unremarkable.     ABDOMEN     LIVER/BILIARY TREE: No focal hepatic lesions. Mild dilatation of the proximal extrahepatic bile duct which tapers to normal caliber distally, may reflect postsurgical changes. No obstructing etiology seen.     GALLBLADDER:  Gallbladder is surgically absent.     SPLEEN:  Unremarkable. Small splenule.     PANCREAS:  Unremarkable.     ADRENAL GLANDS:  Unremarkable.     KIDNEYS/URETERS:  Unremarkable. No hydronephrosis.     STOMACH AND BOWEL: Evaluation of the stomach is limited by underdistention.  No focal pathology seen within limitations.     Small bowel is normal caliber.     No focal inflammatory changes or fluid collections are identified.     APPENDIX: Postsurgical changes of appendectomy.     ABDOMINOPELVIC CAVITY:  No ascites.  No pneumoperitoneum.  No lymphadenopathy.     VESSELS:  Unremarkable for patient's age.     PELVIS     REPRODUCTIVE ORGANS:  Unremarkable for patient's age.     URINARY BLADDER:  Unremarkable.     ABDOMINAL WALL/INGUINAL REGIONS: Tiny fat-containing umbilical hernia.     OSSEOUS STRUCTURES:  No acute fracture or destructive osseous lesion. Multilevel degenerative changes of the spine.  Moderate right foraminal disc protrusion suggested at L4-5 with associated moderate ipsilateral foraminal stenosis but no discernible compressive neuropathy.     IMPRESSION:     1. No acute thoracic, abdominal or pelvic injury. Specifically, no spinal fractures.     2. Moderate right foraminal disc protrusion suggested at L4-5 with associated moderate ipsilateral foraminal stenosis but no discernible compressive neuropathy.     3. Minimally prominent vascular confluence versus 3 mm nodule left lower pulmonary lobe (series 3/91). Based on current Fleischner Society 2017 Guidelines on incidental pulmonary nodule, optional follow-up CT at 12 months can be considered.        LUMBAR SPINE      INDICATION:   low back pain s/p fall.     COMPARISON:  None     VIEWS:  XR SPINE LUMBAR 2 OR 3 VIEWS INJURY        FINDINGS:     Cholecystectomy clips in the right upper quadrant.     There are 5 non rib bearing lumbar vertebral bodies.     There is no evidence of acute fracture or destructive osseous lesion.     Alignment is unremarkable.     No significant lumbar degenerative change noted.     The pedicles appear intact.     Soft tissues are unremarkable.     IMPRESSION:     Normal examination.

## 2024-02-21 PROBLEM — N30.01 ACUTE CYSTITIS WITH HEMATURIA: Status: RESOLVED | Noted: 2023-12-22 | Resolved: 2024-02-21

## 2024-03-19 ENCOUNTER — TELEPHONE (OUTPATIENT)
Age: 49
End: 2024-03-19
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
## 2024-03-20 NOTE — TELEPHONE ENCOUNTER
S/w pt and advised of RD notation.  Pt to start aqua therapy and informed nurse she had an MRI done today by Dr. August, nurse advised pt Dr. August will review MRI once it is final. Pt appreciative of call.

## 2024-06-23 ENCOUNTER — APPOINTMENT (EMERGENCY)
Dept: CT IMAGING | Facility: HOSPITAL | Age: 49
End: 2024-06-23
Payer: MEDICARE

## 2024-06-23 ENCOUNTER — HOSPITAL ENCOUNTER (EMERGENCY)
Facility: HOSPITAL | Age: 49
Discharge: HOME | End: 2024-06-24
Attending: EMERGENCY MEDICINE
Payer: MEDICARE
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
## 2024-06-23 DIAGNOSIS — M54.9 BACK PAIN: Primary | ICD-10-CM

## 2024-06-23 PROCEDURE — 99284 EMERGENCY DEPT VISIT MOD MDM: CPT | Performed by: EMERGENCY MEDICINE

## 2024-06-23 PROCEDURE — 99284 EMERGENCY DEPT VISIT MOD MDM: CPT

## 2024-06-23 PROCEDURE — 72131 CT LUMBAR SPINE W/O DYE: CPT

## 2024-06-23 PROCEDURE — 72192 CT PELVIS W/O DYE: CPT

## 2024-06-23 RX ORDER — OXYCODONE HYDROCHLORIDE 5 MG/1
5 TABLET ORAL ONCE
Status: COMPLETED | OUTPATIENT
Start: 2024-06-23 | End: 2024-06-23

## 2024-06-23 RX ORDER — NAPROXEN 250 MG/1
250 TABLET ORAL ONCE
Status: COMPLETED | OUTPATIENT
Start: 2024-06-23 | End: 2024-06-23

## 2024-06-23 RX ORDER — LIDOCAINE 50 MG/G
2 PATCH TOPICAL ONCE
Status: DISCONTINUED | OUTPATIENT
Start: 2024-06-23 | End: 2024-06-24 | Stop reason: HOSPADM

## 2024-06-23 RX ORDER — ACETAMINOPHEN 325 MG/1
975 TABLET ORAL ONCE
Status: COMPLETED | OUTPATIENT
Start: 2024-06-23 | End: 2024-06-23

## 2024-06-23 RX ORDER — METHOCARBAMOL 500 MG/1
500 TABLET, FILM COATED ORAL ONCE
Status: COMPLETED | OUTPATIENT
Start: 2024-06-23 | End: 2024-06-23

## 2024-06-23 RX ADMIN — OXYCODONE HYDROCHLORIDE 5 MG: 5 TABLET ORAL at 22:34

## 2024-06-23 RX ADMIN — METHOCARBAMOL TABLETS 500 MG: 500 TABLET, COATED ORAL at 22:34

## 2024-06-23 RX ADMIN — NAPROXEN 250 MG: 250 TABLET ORAL at 22:34

## 2024-06-23 RX ADMIN — ACETAMINOPHEN 975 MG: 325 TABLET, FILM COATED ORAL at 22:33

## 2024-06-23 RX ADMIN — LIDOCAINE 5% 2 PATCH: 700 PATCH TOPICAL at 22:34

## 2024-06-23 NOTE — Clinical Note
Luh Chairez was seen and treated in our emergency department on 6/23/2024.                Diagnosis:     Luh  .    She may return on this date: 06/26/2024         If you have any questions or concerns, please don't hesitate to call.      Marichuy Tabor MD    ______________________________           _______________          _______________  Hospital Representative                              Date                                Time

## 2024-06-24 VITALS
DIASTOLIC BLOOD PRESSURE: 67 MMHG | BODY MASS INDEX: 35 KG/M2 | HEART RATE: 78 BPM | RESPIRATION RATE: 18 BRPM | TEMPERATURE: 97.4 F | SYSTOLIC BLOOD PRESSURE: 113 MMHG | OXYGEN SATURATION: 96 % | WEIGHT: 203.93 LBS

## 2024-06-24 RX ORDER — METHOCARBAMOL 500 MG/1
500 TABLET, FILM COATED ORAL 2 TIMES DAILY
Qty: 20 TABLET | Refills: 0 | Status: SHIPPED | OUTPATIENT
Start: 2024-06-24

## 2024-06-24 NOTE — ED NOTES
Pt used bedpan at this time. Pt voided and changed linens. Pt was given warm blanket and is resting at this time.        Consuelo Whittington  06/24/24 0002

## 2024-06-24 NOTE — DISCHARGE INSTRUCTIONS
-You may use a short course of Ibuprofen as needed for 3-5 days.  -Avoid bed rest and stay active as bed rest has been shown to prolong recovery time.  -You may attempt alternative therapies such as yoga, massage therapy, ice/heat, exercise, cisco chi, acupuncture, or nerve stimulation.  -You may apply 5% lidocaine, EMLA cream, capsaicin cream, methyl salicylate (Bengay), topical NSAIDs  -Back pain may continue for weeks to months following discharge. You have been given a referral to the comprehensive spine program for continued care. A nurse will call you to gather further information regarding your back pain and schedule an appointment with an appropriate specialist.   -Please return to the emergency department if you begin to develop the inability to move or feel, the inability to urinate or defecate, fever, or continued or worsening symptoms.

## 2024-06-24 NOTE — ED PROVIDER NOTES
History  Chief Complaint   Patient presents with    Fall     Pt states she was having back pain which caused her legs to give out falling down 3 carpeted steps. Pt reports grabbing the banister and sliding down the stairs on her butt. Pt denies any head strike, no LOC, no thinners. Pt reports pain to her lumbar region of her back that radiates into her left leg.      48-year-old female with history of COPD presents with fall.  Patient states that she was walking down the stairs when her left knee gave out on her causing her to fall down 3-4 carpeted stairs.  She states that she fell directly onto her buttocks with axial loading and then slid down to 3-4 steps.  Denies head strike.  Denies LOC.  Denies thinner use.  Has had midline lower back pain since incident.  Takes naproxen, Robaxin, tramadol.  Denies fevers, chills, history of cancer, saddle paresthesia, changes in urinary or bowel movements, abdominal pain, nausea, vomiting, numbness, weakness, paresthesias.      Fall      Prior to Admission Medications   Prescriptions Last Dose Informant Patient Reported? Taking?   cholecalciferol (VITAMIN D3) 1,000 units tablet Not Taking  No No   Sig: Take 2 tablets (2,000 Units total) by mouth daily To treat vitamin D insufficiency   Patient not taking: Reported on 6/23/2024   lidocaine (Lidoderm) 5 % Not Taking  No No   Sig: Apply 1 patch topically over 12 hours daily Remove & Discard patch within 12 hours or as directed by MD   Patient not taking: Reported on 6/23/2024   meloxicam (Mobic) 7.5 mg tablet   No No   Sig: Take 2 tablets (15 mg total) by mouth daily for 10 days   methocarbamol (ROBAXIN) 500 mg tablet   No No   Sig: Take 2 tablets (1,000 mg total) by mouth daily at bedtime as needed for muscle spasms   methocarbamol (ROBAXIN) 500 mg tablet 6/23/2024  No Yes   Sig: Take 1 tablet (500 mg total) by mouth 2 (two) times a day   naproxen (Naprosyn) 500 mg tablet   No No   Sig: Take 1 tablet (500 mg total) by mouth 2  (two) times a day with meals   Patient not taking: Reported on 1/12/2024   traMADol (ULTRAM) 50 mg tablet 6/23/2024  Yes Yes   Sig: Take 50 mg by mouth every 6 (six) hours as needed for moderate pain      Facility-Administered Medications: None       Past Medical History:   Diagnosis Date    COPD (chronic obstructive pulmonary disease) (HCC)        Past Surgical History:   Procedure Laterality Date    APPENDECTOMY      GALLBLADDER SURGERY      HERNIA REPAIR      REMOVAL BLADDER STIMULATOR      TUBAL LIGATION         History reviewed. No pertinent family history.  I have reviewed and agree with the history as documented.    E-Cigarette/Vaping    E-Cigarette Use Current Every Day User      E-Cigarette/Vaping Substances    Nicotine Yes     THC No     CBD No     Flavoring Yes      Social History     Tobacco Use    Smoking status: Former     Current packs/day: 2.00     Types: Cigarettes    Smokeless tobacco: Never   Vaping Use    Vaping status: Every Day    Substances: Nicotine, Flavoring   Substance Use Topics    Alcohol use: Not Currently     Comment: social drinker    Drug use: Never        Review of Systems   All other systems reviewed and are negative.      Physical Exam  ED Triage Vitals [06/23/24 2153]   Temperature Pulse Respirations Blood Pressure SpO2   (!) 97.4 °F (36.3 °C) 84 20 123/60 97 %      Temp Source Heart Rate Source Patient Position - Orthostatic VS BP Location FiO2 (%)   Temporal Monitor Sitting Left arm --      Pain Score       10 - Worst Possible Pain             Orthostatic Vital Signs  Vitals:    06/23/24 2153 06/23/24 2230 06/24/24 0030   BP: 123/60 122/64 113/67   Pulse: 84 77 78   Patient Position - Orthostatic VS: Sitting Sitting Sitting       Physical Exam  Vitals and nursing note reviewed.   Constitutional:       General: She is not in acute distress.     Appearance: Normal appearance. She is obese. She is not ill-appearing, toxic-appearing or diaphoretic.   HENT:      Head: Normocephalic  and atraumatic.      Right Ear: External ear normal.      Left Ear: External ear normal.      Nose: Nose normal.      Mouth/Throat:      Mouth: Mucous membranes are moist.      Pharynx: Oropharynx is clear.   Eyes:      General: No scleral icterus.        Right eye: No discharge.         Left eye: No discharge.      Extraocular Movements: Extraocular movements intact.   Neck:      Comments: No midline C/T tenderness, step-offs, deformities.  Midline lumbar and sacral tenderness.  Full range of motion of the cervical spine without tenderness.   Cardiovascular:      Rate and Rhythm: Normal rate and regular rhythm.      Pulses: Normal pulses.      Heart sounds: Normal heart sounds. No murmur heard.  Pulmonary:      Effort: Pulmonary effort is normal. No respiratory distress.      Breath sounds: Normal breath sounds. No stridor. No wheezing, rhonchi or rales.   Chest:      Chest wall: No tenderness.   Abdominal:      General: There is no distension.      Palpations: Abdomen is soft. There is no mass.      Tenderness: There is no abdominal tenderness. There is no right CVA tenderness, left CVA tenderness, guarding or rebound.      Hernia: No hernia is present.   Musculoskeletal:         General: Tenderness present. No swelling, deformity or signs of injury. Normal range of motion.      Cervical back: Normal range of motion and neck supple. No rigidity or tenderness.      Right lower leg: No edema.      Left lower leg: No edema.      Comments: Compartments of the lower extremity are soft and nontender.  Full range of motion at the hip, knee, ankles bilaterally.  DP/PT pulses 2+ bilaterally.  Sensation light touch intact over distal extremities.   Skin:     General: Skin is warm and dry.      Capillary Refill: Capillary refill takes less than 2 seconds.      Coloration: Skin is not cyanotic, jaundiced or pale.      Findings: No bruising, erythema, lesion, petechiae or rash.   Neurological:      General: No focal deficit  present.      Mental Status: She is alert and oriented to person, place, and time. Mental status is at baseline.      Sensory: No sensory deficit.   Psychiatric:         Mood and Affect: Mood normal.         Behavior: Behavior normal.         ED Medications  Medications   lidocaine (LIDODERM) 5 % patch 2 patch (2 patches Topical Medication Applied 6/23/24 2234)   acetaminophen (TYLENOL) tablet 975 mg (975 mg Oral Given 6/23/24 2233)   oxyCODONE (ROXICODONE) IR tablet 5 mg (5 mg Oral Given 6/23/24 2234)   methocarbamol (ROBAXIN) tablet 500 mg (500 mg Oral Given 6/23/24 2234)   naproxen (NAPROSYN) tablet 250 mg (250 mg Oral Given 6/23/24 2234)       Diagnostic Studies  Results Reviewed       None                   CT lumbar spine without contrast   Final Result by Cole Barrios MD (06/24 0035)      No acute lumbar spine fracture or subluxation.      Mild noncompressive spondylotic degenerative changes of the lumbar spine.         Workstation performed: [id number]         CT pelvis wo contrast   Final Result by Cole Barrios MD (06/24 0042)      No acute findings in the pelvis within the limits of unenhanced technique.      Workstation performed: [id number]               Procedures  Procedures      ED Course                             SBIRT 22yo+      Flowsheet Row Most Recent Value   Initial Alcohol Screen: US AUDIT-C     1. How often do you have a drink containing alcohol? 0 Filed at: 06/23/2024 2157   2. How many drinks containing alcohol do you have on a typical day you are drinking?  0 Filed at: 06/23/2024 2157   3a. Male UNDER 65: How often do you have five or more drinks on one occasion? 0 Filed at: 06/23/2024 2157   3b. FEMALE Any Age, or MALE 65+: How often do you have 4 or more drinks on one occassion? 0 Filed at: 06/23/2024 2157   Audit-C Score 0 Filed at: 06/23/2024 2157   JAXON: How many times in the past year have you...    Used an illegal drug or used a prescription medication for non-medical  reasons? Never Filed at: 06/23/2024 1984                  Medical Decision Making  48-year-old female presents with back pain after fall.  States that she fell directly onto her buttocks with axial loading and new midline spinal tenderness.  Differential includes but not limited to contusion, sprain, strain, compression fracture, fracture.    Lidoderm patch, Tylenol, Robaxin, naproxen, ice to affected area  CT imaging of the lumbar spine and pelvis.    No acute findings on imaging study.  Patient feeling improved after medication therapy.  Discussed findings and supportive therapy at this time.  Activity as tolerated.  Referral to comprehensive spine given.  Patient understanding and agreement with plan.    Amount and/or Complexity of Data Reviewed  Radiology: ordered.    Risk  OTC drugs.  Prescription drug management.          Disposition  Final diagnoses:   Back pain   Fall, initial encounter     Time reflects when diagnosis was documented in both MDM as applicable and the Disposition within this note       Time User Action Codes Description Comment    6/24/2024 12:19 AM Marichuy Tabor [M54.9] Back pain     6/24/2024 12:19 AM Marichuy Tabor Add [W19.XXXA] Fall, initial encounter           ED Disposition       ED Disposition   Discharge    Condition   Stable    Date/Time   Mon Jun 24, 2024 0019    Comment   Luh Chairez discharge to home/self care.                   Follow-up Information       Follow up With Specialties Details Why Contact Info Additional Information    REMI Murry Nurse Practitioner Schedule an appointment as soon as possible for a visit  As needed [address] [facility] [address]  [phone]       [facility] Emergency Medicine Go to  If symptoms worsen [address] [facility] [id number] [phone] [facility], [address], [place], [address]            Patient's  Medications   Discharge Prescriptions    METHOCARBAMOL (ROBAXIN) 500 MG TABLET    Take 1 tablet (500 mg total) by mouth 2 (two) times a day       Start Date: 6/24/2024 End Date: --       Order Dose: 500 mg       Quantity: 20 tablet    Refills: 0         PDMP Review         Value Time User    PDMP Reviewed  Yes 1/16/2024  1:52 PM Dorota Curran MD             ED Provider  Attending physically available and evaluated Luh Chairez. I managed the patient along with the ED Attending.    Electronically Signed by           Marichuy Tabor MD  06/24/24 0045

## 2024-06-24 NOTE — ED ATTENDING ATTESTATION
6/23/2024  IDana DO, saw and evaluated the patient. I have discussed the patient with the resident/non-physician practitioner and agree with the resident's/non-physician practitioner's findings, Plan of Care, and MDM as documented in the resident's/non-physician practitioner's note, except where noted. All available labs and Radiology studies were reviewed.  I was present for key portions of any procedure(s) performed by the resident/non-physician practitioner and I was immediately available to provide assistance.       At this point I agree with the current assessment done in the Emergency Department.  I have conducted an independent evaluation of this patient a history and physical is as follows:    47yo female presents for back pain, possible injury. Patient states initial fall occurring in December of 2023 which she has been struggling with, today had worse pain. She believes this caused her leg to give out, as a results she slid down a few steps on her buttocks. This worsened her pain and concerned her for additional injury. Denies red flag symptoms prior to or after fall. Will obtain imaging to assess for traumatic injury, doubt cauda equina.     Physical Exam  Vitals reviewed.   Constitutional:       General: She is not in acute distress.     Appearance: Normal appearance. She is not toxic-appearing.      Comments: Laying flat on bed pan   HENT:      Head: Normocephalic and atraumatic.      Right Ear: External ear normal.      Left Ear: External ear normal.   Eyes:      General: No scleral icterus.        Right eye: No discharge.         Left eye: No discharge.   Cardiovascular:      Rate and Rhythm: Normal rate.   Pulmonary:      Effort: Pulmonary effort is normal. No respiratory distress.   Musculoskeletal:         General: No deformity or signs of injury.      Right lower leg: No edema.      Left lower leg: No edema.   Skin:     General: Skin is warm.      Coloration: Skin is not jaundiced  or pale.   Neurological:      General: No focal deficit present.      Mental Status: She is alert. Mental status is at baseline.           ED Course         Critical Care Time  Procedures

## 2024-06-25 ENCOUNTER — TELEPHONE (OUTPATIENT)
Dept: PHYSICAL THERAPY | Facility: OTHER | Age: 49
End: 2024-06-25

## 2024-06-25 NOTE — TELEPHONE ENCOUNTER
Call placed to the patient per [facility] referral.    Spoke with patient. She will be following up with her [facility] doctors. She is established with Physiatry, PM&R, Rehab and Ortho for her back pain    Comp spine closed

## 2024-07-29 ENCOUNTER — HOSPITAL ENCOUNTER (EMERGENCY)
Facility: HOSPITAL | Age: 49
Discharge: HOME | End: 2024-07-29
Payer: COMMERCIAL

## 2024-07-29 VITALS
OXYGEN SATURATION: 96 % | HEART RATE: 81 BPM | SYSTOLIC BLOOD PRESSURE: 126 MMHG | RESPIRATION RATE: 18 BRPM | TEMPERATURE: 97.4 F | DIASTOLIC BLOOD PRESSURE: 68 MMHG

## 2024-07-29 DIAGNOSIS — G89.29 ACUTE EXACERBATION OF CHRONIC LOW BACK PAIN: Primary | ICD-10-CM

## 2024-07-29 DIAGNOSIS — M54.50 ACUTE EXACERBATION OF CHRONIC LOW BACK PAIN: Primary | ICD-10-CM

## 2024-07-29 PROCEDURE — 99284 EMERGENCY DEPT VISIT MOD MDM: CPT

## 2024-07-29 PROCEDURE — 96372 THER/PROPH/DIAG INJ SC/IM: CPT

## 2024-07-29 PROCEDURE — 99283 EMERGENCY DEPT VISIT LOW MDM: CPT

## 2024-07-29 RX ORDER — KETOROLAC TROMETHAMINE 30 MG/ML
15 INJECTION, SOLUTION INTRAMUSCULAR; INTRAVENOUS ONCE
Status: COMPLETED | OUTPATIENT
Start: 2024-07-29 | End: 2024-07-29

## 2024-07-29 RX ORDER — DIAZEPAM 5 MG/ML
5 INJECTION, SOLUTION INTRAMUSCULAR; INTRAVENOUS ONCE
Status: COMPLETED | OUTPATIENT
Start: 2024-07-29 | End: 2024-07-29

## 2024-07-29 RX ADMIN — KETOROLAC TROMETHAMINE 15 MG: 30 INJECTION, SOLUTION INTRAMUSCULAR at 17:38

## 2024-07-29 RX ADMIN — DIAZEPAM 5 MG: 5 INJECTION, SOLUTION INTRAMUSCULAR; INTRAVENOUS at 17:40

## 2024-07-29 NOTE — ED PROVIDER NOTES
"History  Chief Complaint   Patient presents with    Back Pain     Lower back pain that started this morning. Denies any recent injuries but states that she may have overdone it yesterday      This is a 48-year-old female who is presenting today with an exacerbation of her chronic lower back pain.  Patient reports that since she was injured at work at the end of last year she has been having ongoing issues with lower back pain that comes and goes and exacerbations.  She has been seeing a spinal specialist who advised her to progressively increase her activities at home to see how much she can tolerate.  Unfortunately, patient states that as she has been attempting to do more housework (sweeping, mopping, vacuuming) she has been experiencing significantly worsening pain in her lower back.  She localizes the pain to the middle of her lumbar area.  She notes that she has ongoing symptoms occasionally into her bilateral legs of \"giving out\".  This is caused her to fall down the stairs in the past, however she has not fallen over the past several days.  She denies any numbness, weakness, or urinary changes.  She denies specifically any saddle anesthesias, saddle paresthesias, urinary incontinence, or loss of bowel function.  She is denying any fevers or chills, history of immunosuppression, or history of osteoporosis.        Prior to Admission Medications   Prescriptions Last Dose Informant Patient Reported? Taking?   cholecalciferol (VITAMIN D3) 1,000 units tablet   No No   Sig: Take 2 tablets (2,000 Units total) by mouth daily To treat vitamin D insufficiency   Patient not taking: Reported on 6/23/2024   lidocaine (Lidoderm) 5 %   No No   Sig: Apply 1 patch topically over 12 hours daily Remove & Discard patch within 12 hours or as directed by MD   Patient not taking: Reported on 6/23/2024   meloxicam (Mobic) 7.5 mg tablet   No No   Sig: Take 2 tablets (15 mg total) by mouth daily for 10 days   methocarbamol (ROBAXIN) " 500 mg tablet   No No   Sig: Take 2 tablets (1,000 mg total) by mouth daily at bedtime as needed for muscle spasms   methocarbamol (ROBAXIN) 500 mg tablet   No No   Sig: Take 1 tablet (500 mg total) by mouth 2 (two) times a day   methocarbamol (ROBAXIN) 500 mg tablet   No No   Sig: Take 1 tablet (500 mg total) by mouth 2 (two) times a day   naproxen (Naprosyn) 500 mg tablet   No No   Sig: Take 1 tablet (500 mg total) by mouth 2 (two) times a day with meals   Patient not taking: Reported on 1/12/2024   traMADol (ULTRAM) 50 mg tablet   Yes No   Sig: Take 50 mg by mouth every 6 (six) hours as needed for moderate pain      Facility-Administered Medications: None       Past Medical History:   Diagnosis Date    COPD (chronic obstructive pulmonary disease) (HCC)        Past Surgical History:   Procedure Laterality Date    APPENDECTOMY      GALLBLADDER SURGERY      HERNIA REPAIR      REMOVAL BLADDER STIMULATOR      TUBAL LIGATION         No family history on file.  I have reviewed and agree with the history as documented.    E-Cigarette/Vaping    E-Cigarette Use Current Every Day User      E-Cigarette/Vaping Substances    Nicotine Yes     THC No     CBD No     Flavoring Yes      Social History     Tobacco Use    Smoking status: Former     Current packs/day: 2.00     Types: Cigarettes    Smokeless tobacco: Never   Vaping Use    Vaping status: Every Day    Substances: Nicotine, Flavoring   Substance Use Topics    Alcohol use: Not Currently     Comment: social drinker    Drug use: Never       Review of Systems   Constitutional:  Negative for chills and fever.   HENT:  Negative for ear pain and sore throat.    Eyes:  Negative for pain and visual disturbance.   Respiratory:  Negative for cough and shortness of breath.    Cardiovascular:  Negative for chest pain and palpitations.   Gastrointestinal:  Negative for abdominal pain and vomiting.   Genitourinary:  Negative for dysuria and hematuria.   Musculoskeletal:  Positive for  back pain and gait problem. Negative for arthralgias.   Skin:  Negative for color change and rash.   Neurological:  Negative for seizures and syncope.   All other systems reviewed and are negative.      Physical Exam  Physical Exam  Vitals and nursing note reviewed.   Constitutional:       General: She is not in acute distress.     Appearance: She is well-developed.   HENT:      Head: Normocephalic and atraumatic.      Right Ear: External ear normal.      Left Ear: External ear normal.      Nose: Nose normal. No congestion or rhinorrhea.      Mouth/Throat:      Mouth: Mucous membranes are moist.      Pharynx: Oropharynx is clear. No oropharyngeal exudate or posterior oropharyngeal erythema.   Eyes:      General: No scleral icterus.     Extraocular Movements: Extraocular movements intact.      Conjunctiva/sclera: Conjunctivae normal.      Pupils: Pupils are equal, round, and reactive to light.   Cardiovascular:      Rate and Rhythm: Normal rate and regular rhythm.      Pulses: Normal pulses.      Heart sounds: Normal heart sounds. No murmur heard.  Pulmonary:      Effort: Pulmonary effort is normal. No respiratory distress.      Breath sounds: Normal breath sounds. No wheezing or rhonchi.   Abdominal:      General: Abdomen is flat. There is no distension.      Palpations: Abdomen is soft.      Tenderness: There is no abdominal tenderness. There is no guarding.   Musculoskeletal:         General: Tenderness and signs of injury present. No swelling.      Cervical back: Neck supple. No rigidity.      Lumbar back: Spasms and tenderness present. No signs of trauma. Decreased range of motion. Negative right straight leg raise test and negative left straight leg raise test.        Back:       Right lower leg: No edema.      Left lower leg: No edema.   Lymphadenopathy:      Cervical: No cervical adenopathy.   Skin:     General: Skin is warm and dry.      Capillary Refill: Capillary refill takes less than 2 seconds.       Coloration: Skin is not jaundiced.      Findings: No rash.   Neurological:      General: No focal deficit present.      Mental Status: She is alert and oriented to person, place, and time. Mental status is at baseline.   Psychiatric:         Mood and Affect: Mood normal.         Behavior: Behavior normal.         Vital Signs  ED Triage Vitals [07/29/24 1701]   Temperature Pulse Respirations Blood Pressure SpO2   (!) 97.4 °F (36.3 °C) 89 18 141/72 95 %      Temp Source Heart Rate Source Patient Position - Orthostatic VS BP Location FiO2 (%)   Temporal Monitor Sitting Right arm --      Pain Score       7           Vitals:    07/29/24 1701 07/29/24 1745 07/29/24 1800   BP: 141/72  126/68   Pulse: 89 80 81   Patient Position - Orthostatic VS: Sitting           Visual Acuity      ED Medications  Medications   diazepam (VALIUM) injection 5 mg (5 mg Intramuscular Given 7/29/24 1740)   ketorolac (TORADOL) injection 15 mg (15 mg Intramuscular Given 7/29/24 1738)       Diagnostic Studies  Results Reviewed       None                   No orders to display              Procedures  Procedures         ED Course                                 SBIRT 22yo+      Flowsheet Row Most Recent Value   Initial Alcohol Screen: US AUDIT-C     1. How often do you have a drink containing alcohol? 0 Filed at: 07/29/2024 1702   2. How many drinks containing alcohol do you have on a typical day you are drinking?  0 Filed at: 07/29/2024 1702   3b. FEMALE Any Age, or MALE 65+: How often do you have 4 or more drinks on one occassion? 0 Filed at: 07/29/2024 1702   Audit-C Score 0 Filed at: 07/29/2024 1702   JAXON: How many times in the past year have you...    Used an illegal drug or used a prescription medication for non-medical reasons? Never Filed at: 07/29/2024 1702                      Medical Decision Making  Medical complexity: Patient is presenting with acute exacerbation of chronic lower back pain.  Denies any red flags including any saddle  anesthesias or paresthesias, urinary incontinence, urinary frequency, urinary retention, or fecal incontinence or retention.  Patient furthermore has no risk factors for acute new traumatic injury as she has had no recent falls, accidents, or other significant sources of trauma, and lacks any history of osteoporosis or significant exertional forces.  She does state that she has had pain similar to this in the past with exacerbations of her lower back pain at this time, we decided together to defer further radiographic imaging as this would mean an exposure to more radiation.  Patient seems to be suffering from significant paraspinal spasm.  Will treat with dose of muscle relaxer (Valium), and dose of anti-inflammatory for inflammation of the lower back.  Otherwise, I have not much else to offer this patient at this time.  She is not desiring to be admitted to the hospital, and would not really have much benefit from admission on my assessment at this time.  However she does need close follow-up with her spinal specialist.  Patient has an appointment already for 31 July.  At this time I did provide her with a work note to be excused from work until she sees a spinal specialist on the 31st.  She will continue medical therapy at home.  If her pain becomes unbearable and she is unable to ambulate she needs to come back to the emergency department immediately.    Disposition: We discussed at length the return precautions above as well as any saddle anesthesias or paresthesias or urinary incontinence or retention or fecal incontinence.  Patient expressed understanding of these return precautions and agrees that if she develops any of the symptoms she will come immediately back to the emergency department.  Otherwise she will follow-up with Dr. Odonnell at her visit on 7/31, and if necessary will seek second opinion for her ongoing moderate to severe lower back symptoms.    Risk  Prescription drug management.                  Disposition  Final diagnoses:   Acute exacerbation of chronic low back pain     Time reflects when diagnosis was documented in both MDM as applicable and the Disposition within this note       Time User Action Codes Description Comment    7/29/2024  5:32 PM Leon Esposito Add [M54.50,  G89.29] Acute exacerbation of chronic low back pain           ED Disposition       ED Disposition   Discharge    Condition   Stable    Date/Time   Mon Jul 29, 2024 1732    Comment   Luh Conrteras discharge to home/self care.                   Follow-up Information       Follow up With Specialties Details Why Contact Info Additional Information    REMI Murry Nurse Practitioner   [address] [address]  [phone]       [facility] Emergency Department Emergency Medicine Go to  If symptoms worsen, As needed [address] [facility] [id number] [phone] [facility] Emergency Department, [address], [place], [address]    [facility] Physical Therapy Schedule an appointment as soon as possible for a visit   [phone]    [facility] Neurosurgery Pod Neurosurgery   [address] [facility] [id number]             Discharge Medication List as of 7/29/2024  6:10 PM        CONTINUE these medications which have NOT CHANGED    Details   cholecalciferol (VITAMIN D3) 1,000 units tablet Take 2 tablets (2,000 Units total) by mouth daily To treat vitamin D insufficiency, Starting Thu 12/28/2023, Normal      lidocaine (Lidoderm) 5 % Apply 1 patch topically over 12 hours daily Remove & Discard patch within 12 hours or as directed by MD, Starting Thu 12/21/2023, Normal      meloxicam (Mobic) 7.5 mg tablet Take 2 tablets (15 mg total) by mouth daily for 10 days, Starting Tue 12/26/2023, Until Fri 1/5/2024, Normal      !! methocarbamol (ROBAXIN) 500 mg tablet Take 2 tablets (1,000 mg total) by  mouth daily at bedtime as needed for muscle spasms, Starting Thu 12/21/2023, Normal      !! methocarbamol (ROBAXIN) 500 mg tablet Take 1 tablet (500 mg total) by mouth 2 (two) times a day, Starting Mon 1/1/2024, Normal      !! methocarbamol (ROBAXIN) 500 mg tablet Take 1 tablet (500 mg total) by mouth 2 (two) times a day, Starting Mon 6/24/2024, Normal      naproxen (Naprosyn) 500 mg tablet Take 1 tablet (500 mg total) by mouth 2 (two) times a day with meals, Starting Mon 1/1/2024, Normal      traMADol (ULTRAM) 50 mg tablet Take 50 mg by mouth every 6 (six) hours as needed for moderate pain, Historical Med       !! - Potential duplicate medications found. Please discuss with provider.          No discharge procedures on file.    PDMP Review         Value Time User    PDMP Reviewed  Yes 1/16/2024  1:52 PM Dorota Curran MD            ED Provider  Electronically Signed by             Leon Esposito MD  07/30/24 0058

## 2024-07-29 NOTE — Clinical Note
Luh Chairez was seen and treated in our emergency department on 7/29/2024.                Diagnosis:     Luh  is off the rest of the shift today.    She may return on this date: 08/01/2024    Luh should be off of work until she sees her spinal specialist on July 31, at that time they will discuss restrictions that would be appropriate for her.     If you have any questions or concerns, please don't hesitate to call.      Leon Esposito MD    ______________________________           _______________          _______________  Hospital Representative                              Date                                Time

## 2024-07-29 NOTE — Clinical Note
Luh Chairez was seen and treated in our emergency department on 7/29/2024.                Diagnosis:     Luh  is off the rest of the shift today.    She may return on this date: 08/01/2024    uLh should be off of work until she sees her spinal specialist on July 31, at that time they will discuss restrictions that would be appropriate for her.     If you have any questions or concerns, please don't hesitate to call.      Leon Esposito MD    ______________________________           _______________          _______________  Hospital Representative                              Date                                Time

## 2024-07-29 NOTE — DISCHARGE INSTRUCTIONS
I added other phone numbers below for [facility]'s neurosurgeons and spine specialist.  Please call if you need second opinion.  In the meantime, it is becoming apparent that you are not succeeding with conservative treatments at home.  I would speak to your spinal specialist about pursuing more advanced options for treating your symptoms.  If you have urinary incontinence, if you are unable to control your bladder or bowel, if you are unable to feel in between your legs, come back to the emergency department immediately.

## 2024-09-10 ENCOUNTER — HOSPITAL ENCOUNTER (EMERGENCY)
Facility: HOSPITAL | Age: 49
Discharge: HOME | End: 2024-09-10
Attending: EMERGENCY MEDICINE
Payer: COMMERCIAL

## 2024-09-10 VITALS
DIASTOLIC BLOOD PRESSURE: 60 MMHG | SYSTOLIC BLOOD PRESSURE: 121 MMHG | OXYGEN SATURATION: 98 % | HEART RATE: 99 BPM | TEMPERATURE: 97.9 F | RESPIRATION RATE: 18 BRPM

## 2024-09-10 DIAGNOSIS — M54.50 ACUTE EXACERBATION OF CHRONIC LOW BACK PAIN: Primary | ICD-10-CM

## 2024-09-10 DIAGNOSIS — G89.29 ACUTE EXACERBATION OF CHRONIC LOW BACK PAIN: Primary | ICD-10-CM

## 2024-09-10 LAB
BILIRUB UR QL STRIP: NEGATIVE
CLARITY UR: CLEAR
COLOR UR: COLORLESS
GLUCOSE UR STRIP-MCNC: NEGATIVE MG/DL
HGB UR QL STRIP.AUTO: NEGATIVE
KETONES UR STRIP-MCNC: NEGATIVE MG/DL
LEUKOCYTE ESTERASE UR QL STRIP: NEGATIVE
NITRITE UR QL STRIP: NEGATIVE
PH UR STRIP.AUTO: 5.5 [PH]
PROT UR STRIP-MCNC: NEGATIVE MG/DL
SP GR UR STRIP.AUTO: 1.01 (ref 1–1.03)
UROBILINOGEN UR STRIP-ACNC: <2 MG/DL

## 2024-09-10 PROCEDURE — 99284 EMERGENCY DEPT VISIT MOD MDM: CPT | Performed by: PHYSICIAN ASSISTANT

## 2024-09-10 PROCEDURE — 81003 URINALYSIS AUTO W/O SCOPE: CPT | Performed by: PHYSICIAN ASSISTANT

## 2024-09-10 PROCEDURE — 99283 EMERGENCY DEPT VISIT LOW MDM: CPT

## 2024-09-10 PROCEDURE — 96372 THER/PROPH/DIAG INJ SC/IM: CPT

## 2024-09-10 RX ORDER — DIAZEPAM 10 MG/2ML
10 INJECTION, SOLUTION INTRAMUSCULAR; INTRAVENOUS ONCE
Status: COMPLETED | OUTPATIENT
Start: 2024-09-10 | End: 2024-09-10

## 2024-09-10 RX ORDER — KETOROLAC TROMETHAMINE 30 MG/ML
60 INJECTION, SOLUTION INTRAMUSCULAR; INTRAVENOUS ONCE
Status: COMPLETED | OUTPATIENT
Start: 2024-09-10 | End: 2024-09-10

## 2024-09-10 RX ORDER — DEXAMETHASONE SODIUM PHOSPHATE 10 MG/ML
10 INJECTION, SOLUTION INTRAMUSCULAR; INTRAVENOUS ONCE
Status: COMPLETED | OUTPATIENT
Start: 2024-09-10 | End: 2024-09-10

## 2024-09-10 RX ADMIN — DIAZEPAM 10 MG: 5 INJECTION INTRAMUSCULAR; INTRAVENOUS at 20:00

## 2024-09-10 RX ADMIN — DEXAMETHASONE SODIUM PHOSPHATE 10 MG: 10 INJECTION, SOLUTION INTRAMUSCULAR; INTRAVENOUS at 19:56

## 2024-09-10 RX ADMIN — KETOROLAC TROMETHAMINE 60 MG: 30 INJECTION, SOLUTION INTRAMUSCULAR at 19:57

## 2024-09-10 NOTE — ED PROVIDER NOTES
1. Acute exacerbation of chronic low back pain      ED Disposition       ED Disposition   Discharge    Condition   Stable    Date/Time   Tue Sep 10, 2024  8:36 PM    Comment   Luh Chairez discharge to home/self care.                   Assessment & Plan       Medical Decision Making  49-year-old female here for evaluation exacerbation of acute on chronic low back pain.  Taking her prescribed medicines without much relief she also has complaints of some difficulty urinating she has a history of neurogenic bladder in the past.  No true burning with urination.  Differential diagnosis includes cauda equina syndrome however less likely given acute on chronic symptomatology lack of incontinence of urine or stool.  No history of fever.  She follows with pain management.  Differential diagnosis outside of the mentioned above is sciatica,, fracture, UTI, urinary retention.    Urine is clean no evidence of infection or blood.  She has noted symptomatic relief from the intramuscular shot she is willing to try diclofenac gel she does not need refill on her chronic medicines.  She has outpatient MRI in less than 48 hours.    Amount and/or Complexity of Data Reviewed  Labs: ordered. Decision-making details documented in ED Course.    Risk  Prescription drug management.                   ED Course as of 09/10/24 2042   Tue Sep 10, 2024   1953 SpO2: 98 %   1953 Respirations: 18   1953 Pulse: 99   1953 Temperature: 97.9 °F (36.6 °C)   1953 Blood Pressure: 121/60  Vital signs reviewed within reasonable limits.   2031 Blood, UA: Negative   2031 Nitrite, UA: Negative   2031 Clarity, UA: Clear   2036 Patient was reevaluated at bedside at 2030 hrs. she is noting some improvement with the shots.  Urine is clean we will try Voltaren gel she is stable for discharge home she will have her outpatient MRI less than 48 hours.       Medications   ketorolac (TORADOL) injection 60 mg (60 mg Intramuscular Given 9/10/24 1957)   dexamethasone  (PF) (DECADRON) injection 10 mg (10 mg Intramuscular Given 9/10/24 1956)   diazepam (VALIUM) injection 10 mg (10 mg Intramuscular Given 9/10/24 2000)       History of Present Illness       This is a 49-year-old female with a history of ongoing chronic back pain.  This is her third emergency department visit over the last few months she does follow with primary care as well as pain specialist.  She is awaiting an MRI scheduled in less than 48 hours.    She states she is having an acute exacerbation of bilateral low back pain radiating to the bilateral gluteal regions.  Last CT scan was 3 months ago it did show some for L5 as well as L5-S1 degenerative changes.  She self treats at home with naproxen as well as Robaxin and tramadol.  She feels though at times she has some difficulty urinating however she denies incontinence of urine or stool.  Her symptoms appear to be exacerbations of her chronic complaints.  There is no true novel findings.            Luh Chairez is a 49 y.o. female who identifies as a female presenting to the Emergency Department for      Review of Systems   Constitutional:  Negative for chills and fever.   HENT:  Negative for ear pain and sore throat.    Eyes:  Negative for pain and visual disturbance.   Respiratory:  Negative for cough and shortness of breath.    Cardiovascular:  Negative for chest pain and palpitations.   Gastrointestinal:  Negative for abdominal pain and vomiting.   Genitourinary:  Negative for dysuria and hematuria.   Musculoskeletal:  Positive for back pain. Negative for arthralgias.   Skin:  Negative for color change and rash.   Neurological:  Negative for seizures and syncope.   All other systems reviewed and are negative.          Objective     ED Triage Vitals [09/10/24 1909]   Temperature Pulse Blood Pressure Respirations SpO2 Patient Position - Orthostatic VS   97.9 °F (36.6 °C) 99 121/60 18 98 % --      Temp Source Heart Rate Source BP Location FiO2 (%) Pain  Score    Temporal -- -- -- 8        Physical Exam  Vitals reviewed.   Constitutional:       General: She is not in acute distress.     Appearance: Normal appearance. She is not ill-appearing, toxic-appearing or diaphoretic.   HENT:      Head: Normocephalic and atraumatic.      Right Ear: External ear normal.      Left Ear: External ear normal.   Eyes:      General: No scleral icterus.        Right eye: No discharge.         Left eye: No discharge.      Extraocular Movements: Extraocular movements intact.      Conjunctiva/sclera: Conjunctivae normal.   Cardiovascular:      Rate and Rhythm: Normal rate.      Pulses: Normal pulses.   Pulmonary:      Effort: Pulmonary effort is normal. No respiratory distress.      Breath sounds: No stridor.   Musculoskeletal:         General: Tenderness present. No deformity or signs of injury.        Arms:       Cervical back: Normal range of motion. No rigidity.   Skin:     General: Skin is warm.      Coloration: Skin is not jaundiced.      Findings: No lesion or rash.   Neurological:      General: No focal deficit present.      Mental Status: She is alert and oriented to person, place, and time. Mental status is at baseline.      Gait: Gait normal.   Psychiatric:         Mood and Affect: Mood normal.         Thought Content: Thought content normal.         Judgment: Judgment normal.         Labs Reviewed   URINALYSIS WITH REFLEX TO SCOPE     No orders to display       Procedures       Willie Tinoco PA-C  09/10/24 2042

## 2025-02-03 ENCOUNTER — OFFICE VISIT (OUTPATIENT)
Dept: URGENT CARE | Facility: CLINIC | Age: 50
End: 2025-02-03
Payer: COMMERCIAL

## 2025-02-03 VITALS
SYSTOLIC BLOOD PRESSURE: 118 MMHG | DIASTOLIC BLOOD PRESSURE: 80 MMHG | RESPIRATION RATE: 14 BRPM | WEIGHT: 204.4 LBS | HEART RATE: 98 BPM | TEMPERATURE: 97.1 F | HEIGHT: 64 IN | OXYGEN SATURATION: 97 % | BODY MASS INDEX: 34.89 KG/M2

## 2025-02-03 DIAGNOSIS — L02.421 FURUNCLE OF RIGHT AXILLA: Primary | ICD-10-CM

## 2025-02-03 PROCEDURE — 87186 SC STD MICRODIL/AGAR DIL: CPT

## 2025-02-03 PROCEDURE — 10060 I&D ABSCESS SIMPLE/SINGLE: CPT

## 2025-02-03 PROCEDURE — 99213 OFFICE O/P EST LOW 20 MIN: CPT

## 2025-02-03 PROCEDURE — 87205 SMEAR GRAM STAIN: CPT

## 2025-02-03 PROCEDURE — 87147 CULTURE TYPE IMMUNOLOGIC: CPT

## 2025-02-03 PROCEDURE — 87070 CULTURE OTHR SPECIMN AEROBIC: CPT

## 2025-02-03 RX ORDER — LIDOCAINE HYDROCHLORIDE 10 MG/ML
5 INJECTION, SOLUTION EPIDURAL; INFILTRATION; INTRACAUDAL; PERINEURAL ONCE
Status: COMPLETED | OUTPATIENT
Start: 2025-02-03 | End: 2025-02-03

## 2025-02-03 RX ORDER — SULFAMETHOXAZOLE AND TRIMETHOPRIM 800; 160 MG/1; MG/1
1 TABLET ORAL EVERY 12 HOURS SCHEDULED
Qty: 14 TABLET | Refills: 0 | Status: SHIPPED | OUTPATIENT
Start: 2025-02-03 | End: 2025-02-10

## 2025-02-03 RX ADMIN — LIDOCAINE HYDROCHLORIDE 5 ML: 10 INJECTION, SOLUTION EPIDURAL; INFILTRATION; INTRACAUDAL; PERINEURAL at 18:45

## 2025-02-03 NOTE — PROGRESS NOTES
[facility]        NAME: Luh Chairez is a 49 y.o. female  : 1975    MRN: [id number]  DATE: February 3, 2025  TIME: 7:23 PM    Assessment and Plan   Furuncle of right axilla [L02.421]  1. Furuncle of right axilla  lidocaine (PF) (XYLOCAINE-MPF) 1 % injection 5 mL    sulfamethoxazole-trimethoprim (BACTRIM DS) 800-160 mg per tablet    Ambulatory Referral to General Surgery        Pt elects I&D of abscess in right axillary area as she reports she has been using warm compresses to try to get the area to drain and has been unsuccessful. Reports she had a Bartholin cyst in the past and has not had any abscesses or cysts since that time 20+ years ago. Started using a new deodorant a few weeks ago and developed irritation in the axillary area which has now significantly increased in size and redness has spread. Pt has a noticeable abscessed area with notable redness, warmth, swelling, tenderness. No drainage or bleeding from site.     Patient Instructions       Follow up with PCP in 3-5 days.  Proceed to  ER if symptoms worsen.    If tests are performed, our office will contact you with results only if changes need to made to the care plan discussed with you at the visit. You can review your full results on [facility].    Chief Complaint     Chief Complaint   Patient presents with    lump under arm     Has been there about a week. Under both axilla. Right is getting larger in size, sore, redness, no drainage noted. Left axilla small, redness, no drainage or soreness noted. .         History of Present Illness       49-year-old female with past medical history significant for vitamin D insufficiency, vitamin B 12 deficiency, elevated TSH, COPD presents with complaint of lump under her arm.  Patient reports lump is present x 1 week she has them in bilateral axillary areas.  She reports the right is getting larger in size and increasing in soreness, redness.  There is no drainage noted.  The  left axillary sore is small, red and has no drainage or soreness noted.      Review of Systems   Review of Systems   Skin:  Positive for color change and wound.         Current Medications       Current Outpatient Medications:     Diclofenac Sodium (VOLTAREN) 1 %, Apply 2 g topically 4 (four) times a day, Disp: 240 g, Rfl: 0    methocarbamol (ROBAXIN) 500 mg tablet, Take 2 tablets (1,000 mg total) by mouth daily at bedtime as needed for muscle spasms, Disp: 7 tablet, Rfl: 0    sulfamethoxazole-trimethoprim (BACTRIM DS) 800-160 mg per tablet, Take 1 tablet by mouth every 12 (twelve) hours for 7 days, Disp: 14 tablet, Rfl: 0    traMADol (ULTRAM) 50 mg tablet, Take 50 mg by mouth every 6 (six) hours as needed for moderate pain, Disp: , Rfl:     cholecalciferol (VITAMIN D3) 1,000 units tablet, Take 2 tablets (2,000 Units total) by mouth daily To treat vitamin D insufficiency (Patient not taking: Reported on 2/3/2025), Disp: 30 tablet, Rfl: 0    lidocaine (Lidoderm) 5 %, Apply 1 patch topically over 12 hours daily Remove & Discard patch within 12 hours or as directed by MD (Patient not taking: Reported on 2/3/2025), Disp: 10 patch, Rfl: 0    meloxicam (Mobic) 7.5 mg tablet, Take 2 tablets (15 mg total) by mouth daily for 10 days, Disp: 20 tablet, Rfl: 0    methocarbamol (ROBAXIN) 500 mg tablet, Take 1 tablet (500 mg total) by mouth 2 (two) times a day (Patient not taking: Reported on 2/3/2025), Disp: 20 tablet, Rfl: 0    methocarbamol (ROBAXIN) 500 mg tablet, Take 1 tablet (500 mg total) by mouth 2 (two) times a day (Patient not taking: Reported on 2/3/2025), Disp: 20 tablet, Rfl: 0    naproxen (Naprosyn) 500 mg tablet, Take 1 tablet (500 mg total) by mouth 2 (two) times a day with meals (Patient not taking: Reported on 2/3/2025), Disp: 30 tablet, Rfl: 0  No current facility-administered medications for this visit.    Current Allergies     Allergies as of 02/03/2025 - Reviewed 02/03/2025   Allergen Reaction Noted     "Gabapentin Other (See Comments) 02/13/2024    Hydrocodone-acetaminophen Hives 06/03/2020    Oxycodone-acetaminophen GI Intolerance 05/13/2013    Flavoring agent Rash 02/03/2025    Food Rash 05/13/2013    Vaccinium angustifolium Hives and Rash 12/04/2017            The following portions of the patient's history were reviewed and updated as appropriate: allergies, current medications, past family history, past medical history, past social history, past surgical history and problem list.     Past Medical History:   Diagnosis Date    COPD (chronic obstructive pulmonary disease) (HCC)        Past Surgical History:   Procedure Laterality Date    APPENDECTOMY      GALLBLADDER SURGERY      HERNIA REPAIR      REMOVAL BLADDER STIMULATOR      TUBAL LIGATION         Family History   Problem Relation Age of Onset    No Known Problems Mother     No Known Problems Father          Medications have been verified.        Objective   /80   Pulse 98   Temp (!) 97.1 °F (36.2 °C)   Resp 14   Ht 5' 4\" (1.626 m)   Wt 92.7 kg (204 lb 6.4 oz)   LMP 02/03/2025 (Exact Date)   SpO2 97%   BMI 35.09 kg/m²        Physical Exam     Physical Exam  Vitals and nursing note reviewed.   Constitutional:       Appearance: Normal appearance. She is obese.   HENT:      Head: Normocephalic and atraumatic.   Cardiovascular:      Rate and Rhythm: Normal rate and regular rhythm.      Pulses: Normal pulses.      Heart sounds: Normal heart sounds.   Pulmonary:      Effort: Pulmonary effort is normal.      Breath sounds: Normal breath sounds.   Musculoskeletal:      Cervical back: Normal range of motion and neck supple.   Lymphadenopathy:      Cervical: No cervical adenopathy.   Skin:     General: Skin is warm and dry.      Capillary Refill: Capillary refill takes less than 2 seconds.      Findings: Abscess and erythema present.          Neurological:      General: No focal deficit present.      Mental Status: She is alert and oriented to person, " "place, and time. Mental status is at baseline.           Incision and drain    Date/Time: 2/3/2025 6:47 PM    Performed by: REMI Whaley  Authorized by: REMI Whaley  Universal Protocol:  procedure performed by consultantConsent: Verbal consent obtained. Written consent obtained.  Risks and benefits: risks, benefits and alternatives were discussed  Time out: Immediately prior to procedure a \"time out\" was called to verify the correct patient, procedure, equipment, support staff and site/side marked as required.  Patient understanding: patient states understanding of the procedure being performed  Patient consent: the patient's understanding of the procedure matches consent given  Procedure consent: procedure consent matches procedure scheduled  Patient identity confirmed: verbally with patient    Patient location:  Clinic  Location:     Type:  Abscess    Location:  Upper extremity (RIGHT AXILLA)  Pre-procedure details:     Skin preparation:  Betadine  Anesthesia (see MAR for exact dosages):     Anesthesia method:  Local infiltration    Local anesthetic:  Lidocaine 1% w/o epi  Procedure details:     Complexity:  Simple    Needle aspiration: no      Incision types:  Stab incision    Scalpel blade:  11    Approach:  Puncture    Incision depth:  Subcutaneous    Wound management:  Probed and deloculated and irrigated with saline    Irrigation with saline:  50    Hemostat:  Opened with hemostat    Drainage:  Purulent    Drainage amount:  Moderate    Wound treatment:  Wound left open    Packing materials:  None  Post-procedure details:     Patient tolerance of procedure:  Tolerated well, no immediate complications  Comments:      Cleansed area after procedure and applied non stick telfa pad, gauze folded in half and a large tegaderm. Advised to change bandage daily and more often as needed if soiled          "

## 2025-02-03 NOTE — PATIENT INSTRUCTIONS
We have opened and drained your abscess of your armpit    If any worsening redness, swelling, warmth, discharge or pain you are advised to have a recheck asap    Take the abx twice daily for the total of 7 days until completion    Follow up with family dr if you feel the area is not improving    Consider follow up with general surgeon for advanced evaluation

## 2025-02-04 ENCOUNTER — RESULTS FOLLOW-UP (OUTPATIENT)
Dept: URGENT CARE | Facility: CLINIC | Age: 50
End: 2025-02-04

## 2025-02-06 LAB
BACTERIA WND AEROBE CULT: ABNORMAL
GRAM STN SPEC: ABNORMAL

## 2025-06-30 ENCOUNTER — HOSPITAL ENCOUNTER (EMERGENCY)
Facility: HOSPITAL | Age: 50
Discharge: HOME | End: 2025-06-30
Attending: EMERGENCY MEDICINE | Admitting: EMERGENCY MEDICINE
Payer: OTHER MISCELLANEOUS

## 2025-06-30 VITALS
SYSTOLIC BLOOD PRESSURE: 152 MMHG | WEIGHT: 202.82 LBS | DIASTOLIC BLOOD PRESSURE: 84 MMHG | RESPIRATION RATE: 18 BRPM | OXYGEN SATURATION: 98 % | HEART RATE: 77 BPM | HEIGHT: 64 IN | BODY MASS INDEX: 34.63 KG/M2 | TEMPERATURE: 97.9 F

## 2025-06-30 DIAGNOSIS — S39.012A STRAIN OF LUMBAR REGION, INITIAL ENCOUNTER: Primary | ICD-10-CM

## 2025-06-30 DIAGNOSIS — G89.29 ACUTE EXACERBATION OF CHRONIC LOW BACK PAIN: ICD-10-CM

## 2025-06-30 DIAGNOSIS — N39.0 UTI (URINARY TRACT INFECTION): ICD-10-CM

## 2025-06-30 DIAGNOSIS — M54.50 ACUTE EXACERBATION OF CHRONIC LOW BACK PAIN: ICD-10-CM

## 2025-06-30 LAB
ANION GAP SERPL CALCULATED.3IONS-SCNC: 9 MMOL/L (ref 4–13)
BACTERIA UR QL AUTO: ABNORMAL /HPF
BASOPHILS # BLD AUTO: 0.02 THOUSANDS/ÂΜL (ref 0–0.1)
BASOPHILS NFR BLD AUTO: 0 % (ref 0–1)
BILIRUB UR QL STRIP: NEGATIVE
BUN SERPL-MCNC: 13 MG/DL (ref 5–25)
CALCIUM SERPL-MCNC: 9.2 MG/DL (ref 8.4–10.2)
CHLORIDE SERPL-SCNC: 102 MMOL/L (ref 96–108)
CLARITY UR: ABNORMAL
CO2 SERPL-SCNC: 26 MMOL/L (ref 21–32)
COLOR UR: YELLOW
CREAT SERPL-MCNC: 0.74 MG/DL (ref 0.6–1.3)
EOSINOPHIL # BLD AUTO: 0.05 THOUSAND/ÂΜL (ref 0–0.61)
EOSINOPHIL NFR BLD AUTO: 1 % (ref 0–6)
ERYTHROCYTE [DISTWIDTH] IN BLOOD BY AUTOMATED COUNT: 12.2 % (ref 11.6–15.1)
GFR SERPL CREATININE-BSD FRML MDRD: 94 ML/MIN/1.73SQ M
GLUCOSE SERPL-MCNC: 111 MG/DL (ref 65–140)
GLUCOSE UR STRIP-MCNC: NEGATIVE MG/DL
HCT VFR BLD AUTO: 39.7 % (ref 34.8–46.1)
HGB BLD-MCNC: 13.4 G/DL (ref 11.5–15.4)
HGB UR QL STRIP.AUTO: NEGATIVE
IMM GRANULOCYTES # BLD AUTO: 0.02 THOUSAND/UL (ref 0–0.2)
IMM GRANULOCYTES NFR BLD AUTO: 0 % (ref 0–2)
KETONES UR STRIP-MCNC: NEGATIVE MG/DL
LEUKOCYTE ESTERASE UR QL STRIP: ABNORMAL
LYMPHOCYTES # BLD AUTO: 2.26 THOUSANDS/ÂΜL (ref 0.6–4.47)
LYMPHOCYTES NFR BLD AUTO: 38 % (ref 14–44)
MCH RBC QN AUTO: 31.2 PG (ref 26.8–34.3)
MCHC RBC AUTO-ENTMCNC: 33.8 G/DL (ref 31.4–37.4)
MCV RBC AUTO: 92 FL (ref 82–98)
MONOCYTES # BLD AUTO: 0.35 THOUSAND/ÂΜL (ref 0.17–1.22)
MONOCYTES NFR BLD AUTO: 6 % (ref 4–12)
NEUTROPHILS # BLD AUTO: 3.2 THOUSANDS/ÂΜL (ref 1.85–7.62)
NEUTS SEG NFR BLD AUTO: 55 % (ref 43–75)
NITRITE UR QL STRIP: POSITIVE
NON-SQ EPI CELLS URNS QL MICRO: ABNORMAL /HPF
NRBC BLD AUTO-RTO: 0 /100 WBCS
PH UR STRIP.AUTO: 6 [PH]
PLATELET # BLD AUTO: 213 THOUSANDS/UL (ref 149–390)
PMV BLD AUTO: 9.1 FL (ref 8.9–12.7)
POTASSIUM SERPL-SCNC: 3.9 MMOL/L (ref 3.5–5.3)
PROT UR STRIP-MCNC: NEGATIVE MG/DL
RBC # BLD AUTO: 4.3 MILLION/UL (ref 3.81–5.12)
RBC #/AREA URNS AUTO: ABNORMAL /HPF
SODIUM SERPL-SCNC: 137 MMOL/L (ref 135–147)
SP GR UR STRIP.AUTO: 1.02 (ref 1–1.03)
UROBILINOGEN UR STRIP-ACNC: <2 MG/DL
WBC # BLD AUTO: 5.9 THOUSAND/UL (ref 4.31–10.16)
WBC #/AREA URNS AUTO: ABNORMAL /HPF

## 2025-06-30 PROCEDURE — 96375 TX/PRO/DX INJ NEW DRUG ADDON: CPT

## 2025-06-30 PROCEDURE — 85025 COMPLETE CBC W/AUTO DIFF WBC: CPT | Performed by: EMERGENCY MEDICINE

## 2025-06-30 PROCEDURE — 81001 URINALYSIS AUTO W/SCOPE: CPT | Performed by: EMERGENCY MEDICINE

## 2025-06-30 PROCEDURE — 80048 BASIC METABOLIC PNL TOTAL CA: CPT | Performed by: EMERGENCY MEDICINE

## 2025-06-30 PROCEDURE — 99283 EMERGENCY DEPT VISIT LOW MDM: CPT

## 2025-06-30 PROCEDURE — 36415 COLL VENOUS BLD VENIPUNCTURE: CPT | Performed by: EMERGENCY MEDICINE

## 2025-06-30 PROCEDURE — 87086 URINE CULTURE/COLONY COUNT: CPT | Performed by: EMERGENCY MEDICINE

## 2025-06-30 PROCEDURE — 99285 EMERGENCY DEPT VISIT HI MDM: CPT | Performed by: EMERGENCY MEDICINE

## 2025-06-30 PROCEDURE — 87077 CULTURE AEROBIC IDENTIFY: CPT | Performed by: EMERGENCY MEDICINE

## 2025-06-30 PROCEDURE — 96376 TX/PRO/DX INJ SAME DRUG ADON: CPT

## 2025-06-30 PROCEDURE — 87186 SC STD MICRODIL/AGAR DIL: CPT | Performed by: EMERGENCY MEDICINE

## 2025-06-30 PROCEDURE — 96374 THER/PROPH/DIAG INJ IV PUSH: CPT

## 2025-06-30 RX ORDER — METHYLPREDNISOLONE 4 MG/1
TABLET ORAL
Qty: 21 TABLET | Refills: 0 | Status: SHIPPED | OUTPATIENT
Start: 2025-06-30

## 2025-06-30 RX ORDER — METHYLPREDNISOLONE SODIUM SUCCINATE 125 MG/2ML
125 INJECTION, POWDER, LYOPHILIZED, FOR SOLUTION INTRAMUSCULAR; INTRAVENOUS ONCE
Status: COMPLETED | OUTPATIENT
Start: 2025-06-30 | End: 2025-06-30

## 2025-06-30 RX ORDER — OXYCODONE HYDROCHLORIDE 5 MG/1
5 TABLET ORAL EVERY 6 HOURS PRN
Qty: 10 TABLET | Refills: 0 | Status: SHIPPED | OUTPATIENT
Start: 2025-06-30

## 2025-06-30 RX ORDER — FENTANYL CITRATE 50 UG/ML
50 INJECTION, SOLUTION INTRAMUSCULAR; INTRAVENOUS ONCE
Status: COMPLETED | OUTPATIENT
Start: 2025-06-30 | End: 2025-06-30

## 2025-06-30 RX ORDER — CEPHALEXIN 500 MG/1
500 CAPSULE ORAL EVERY 6 HOURS SCHEDULED
Qty: 28 CAPSULE | Refills: 0 | Status: SHIPPED | OUTPATIENT
Start: 2025-06-30 | End: 2025-07-07

## 2025-06-30 RX ORDER — KETOROLAC TROMETHAMINE 30 MG/ML
15 INJECTION, SOLUTION INTRAMUSCULAR; INTRAVENOUS ONCE
Status: COMPLETED | OUTPATIENT
Start: 2025-06-30 | End: 2025-06-30

## 2025-06-30 RX ORDER — FENTANYL CITRATE 50 UG/ML
25 INJECTION, SOLUTION INTRAMUSCULAR; INTRAVENOUS ONCE
Refills: 0 | Status: COMPLETED | OUTPATIENT
Start: 2025-06-30 | End: 2025-06-30

## 2025-06-30 RX ORDER — OXYCODONE HYDROCHLORIDE 5 MG/1
5 TABLET ORAL ONCE
Refills: 0 | Status: COMPLETED | OUTPATIENT
Start: 2025-06-30 | End: 2025-06-30

## 2025-06-30 RX ADMIN — FENTANYL CITRATE 50 MCG: 50 INJECTION INTRAMUSCULAR; INTRAVENOUS at 14:28

## 2025-06-30 RX ADMIN — METHYLPREDNISOLONE SODIUM SUCCINATE 125 MG: 125 INJECTION, POWDER, FOR SOLUTION INTRAMUSCULAR; INTRAVENOUS at 14:27

## 2025-06-30 RX ADMIN — KETOROLAC TROMETHAMINE 15 MG: 30 INJECTION, SOLUTION INTRAMUSCULAR at 14:26

## 2025-06-30 RX ADMIN — OXYCODONE HYDROCHLORIDE 5 MG: 5 TABLET ORAL at 15:56

## 2025-06-30 RX ADMIN — CEPHALEXIN 500 MG: 250 CAPSULE ORAL at 15:57

## 2025-06-30 RX ADMIN — FENTANYL CITRATE 25 MCG: 50 INJECTION INTRAMUSCULAR; INTRAVENOUS at 15:57

## 2025-06-30 NOTE — Clinical Note
Tez Chairez accompanied Luh Chairez to the emergency department on 6/30/2025.    Return date if applicable: 07/01/2025        If you have any questions or concerns, please don't hesitate to call.      Henrique Dickey, DO

## 2025-06-30 NOTE — ED PROVIDER NOTES
Time reflects when diagnosis was documented in both MDM as applicable and the Disposition within this note       Time User Action Codes Description Comment    6/30/2025  3:51 PM Henrique Dickey Add [S39.012A] Strain of lumbar region, initial encounter     6/30/2025  3:51 PM Henrique Dickey Add [M54.50,  G89.29] Acute exacerbation of chronic low back pain     6/30/2025  3:52 PM Henrique Dickey Add [N39.0] UTI (urinary tract infection)           ED Disposition       ED Disposition   Discharge    Condition   Stable    Date/Time   Mon Jun 30, 2025  3:51 PM    Comment   Luh Landondolores discharge to home/self care.                   Assessment & Plan       Medical Decision Making  Patient is a pleasant 49-year-old female with chronic back pain since a slip and fall accident injuring her low back approximate 2 years ago.  Patient was at physical therapy when her back pain worsened.  No bowel or bladder incontinence or retention.  No perineal anesthesia.      Previous records and MRI findings reviewed.  Will treat with, fentanyl, Solu-Medrol, Toradol.    Follow up with Spine as scheduled. Return if worsens. Rx for steroids, analgesia, NSAIDs.  Patient also found to have a urinary tract infection and I will prescribe course of antibiotics and will be sent to the pharmacy.    Problems Addressed:  Acute exacerbation of chronic low back pain: acute illness or injury  Strain of lumbar region, initial encounter: acute illness or injury  UTI (urinary tract infection): acute illness or injury    Amount and/or Complexity of Data Reviewed  Labs: ordered. Decision-making details documented in ED Course.     Details: +UTI    Risk  OTC drugs.  Prescription drug management.    I personally discussed return precautions with this patient and family. I provided the patient with written discharge instructions and particularly highlighted specific areas of interest to this patient, including but not limited to: medications for  symptom managment, follow up recommendations, and return precautions. Patient and family are in agreement with this plan as outlined above.         Medications   methylPREDNISolone sodium succinate (Solu-MEDROL) injection 125 mg (125 mg Intravenous Given 6/30/25 1427)   ketorolac (TORADOL) injection 15 mg (15 mg Intravenous Given 6/30/25 1426)   fentaNYL injection 50 mcg (50 mcg Intravenous Given 6/30/25 1428)   fentaNYL injection 25 mcg (25 mcg Intravenous Given 6/30/25 1557)   oxyCODONE (ROXICODONE) IR tablet 5 mg (5 mg Oral Given 6/30/25 1556)   cephalexin (KEFLEX) capsule 500 mg (500 mg Oral Given 6/30/25 1557)       ED Risk Strat Scores                    No data recorded                            History of Present Illness       Chief Complaint   Patient presents with    Back Pain     Pt arrives from PT with lower back pain radiating into bilateral legs. Pt has chronic back pain x3 years.        Past Medical History[1]   Past Surgical History[2]   Family History[3]   Social History[4]   E-Cigarette/Vaping    E-Cigarette Use Current Every Day User       E-Cigarette/Vaping Substances    Nicotine Yes     THC No     CBD No     Flavoring No       I have reviewed and agree with the history as documented.     Patient is a pleasant 49-year-old female with chronic back pain since a slip and fall accident injuring her low back approximate 2 years ago.  Patient was at physical therapy when her back pain worsened.  No bowel or bladder incontinence or retention.  No perineal anesthesia.  Patient does follow with neurosurgery for her back pain as well as pain in spine.  Patient is in the process of possible spinal stimulator placement.  No recent falls or injuries.  She states she was overexerting and slightly while at physical therapy today which could account for the increase in her low back pain.  Occasional radiation down the left leg.  Mostly involving the left outer thigh.  No weakness no difficulty  ambulating.          Review of Systems   Constitutional:  Negative for appetite change, chills, fatigue, fever and unexpected weight change.   HENT:  Negative for congestion, ear pain, rhinorrhea and sore throat.    Eyes:  Negative for pain and visual disturbance.   Respiratory:  Negative for cough, chest tightness, shortness of breath and wheezing.    Cardiovascular:  Negative for chest pain, palpitations and leg swelling.   Gastrointestinal:  Negative for abdominal pain, constipation, diarrhea, nausea and vomiting.   Genitourinary:  Negative for difficulty urinating, dysuria, frequency, hematuria, menstrual problem, pelvic pain, vaginal bleeding and vaginal discharge.   Musculoskeletal:  Positive for back pain. Negative for arthralgias and neck pain.   Skin:  Negative for color change and rash.   Neurological:  Negative for dizziness, seizures, syncope, light-headedness and headaches.   Psychiatric/Behavioral:  Negative for sleep disturbance.    All other systems reviewed and are negative.          Objective       ED Triage Vitals   Temperature Pulse Blood Pressure Respirations SpO2 Patient Position - Orthostatic VS   06/30/25 1306 06/30/25 1255 06/30/25 1255 06/30/25 1255 06/30/25 1255 06/30/25 1255   97.9 °F (36.6 °C) 86 155/73 18 97 % Sitting      Temp Source Heart Rate Source BP Location FiO2 (%) Pain Score    06/30/25 1306 06/30/25 1255 06/30/25 1255 -- 06/30/25 1255    Temporal Monitor Right arm  9      Vitals      Date and Time Temp Pulse SpO2 Resp BP Pain Score FACES Pain Rating User   06/30/25 1600 -- 77 98 % 18 152/84 -- -- MD   06/30/25 1557 -- -- -- -- -- 7 -- MD   06/30/25 1556 -- -- -- -- -- 7 -- MD   06/30/25 1530 -- 74 97 % 17 112/82 -- -- MD   06/30/25 1428 -- -- -- -- -- 10 - Worst Possible Pain -- MD   06/30/25 1426 -- -- -- -- -- 10 - Worst Possible Pain -- MD   06/30/25 1321 -- -- -- -- -- 9 --    06/30/25 1306 97.9 °F (36.6 °C) -- -- -- -- -- -- CB   06/30/25 1255 -- 86 97 % 18 155/73 9 --  MD            Physical Exam  Vitals and nursing note reviewed.   Constitutional:       General: She is not in acute distress.     Appearance: Normal appearance. She is well-developed and normal weight. She is not ill-appearing, toxic-appearing or diaphoretic.   HENT:      Head: Normocephalic and atraumatic.      Nose: Nose normal.      Mouth/Throat:      Mouth: Mucous membranes are moist.      Pharynx: Oropharynx is clear.     Eyes:      General: No scleral icterus.     Extraocular Movements: Extraocular movements intact.      Conjunctiva/sclera: Conjunctivae normal.       Cardiovascular:      Rate and Rhythm: Normal rate and regular rhythm.      Pulses: Normal pulses.      Heart sounds: Normal heart sounds. No murmur heard.     No friction rub. No gallop.   Pulmonary:      Effort: Pulmonary effort is normal. No respiratory distress.      Breath sounds: Normal breath sounds. No wheezing or rales.   Chest:      Chest wall: No tenderness.   Abdominal:      General: Bowel sounds are normal. There is no distension.      Palpations: Abdomen is soft. There is no mass.      Tenderness: There is no abdominal tenderness. There is no right CVA tenderness, left CVA tenderness, guarding or rebound.      Hernia: No hernia is present.     Musculoskeletal:         General: Tenderness present. No deformity. Normal range of motion.      Cervical back: Normal range of motion and neck supple. No rigidity or tenderness.        Back:       Right lower leg: No edema.      Left lower leg: No edema.   Lymphadenopathy:      Cervical: No cervical adenopathy.     Skin:     General: Skin is warm and dry.      Capillary Refill: Capillary refill takes less than 2 seconds.      Coloration: Skin is not jaundiced or pale.      Findings: No bruising, erythema, lesion or rash.     Neurological:      General: No focal deficit present.      Mental Status: She is alert and oriented to person, place, and time. Mental status is at baseline.      Psychiatric:         Mood and Affect: Mood normal.         Behavior: Behavior normal.         Results Reviewed       Procedure Component Value Units Date/Time    Urine culture [[id number]]  (Abnormal) Collected: 06/30/25 1438    Lab Status: Preliminary result Specimen: Urine, Clean Catch Updated: 07/01/25 2027     Urine Culture >100,000 cfu/ml Escherichia coli    Urine Microscopic [[id number]]  (Abnormal) Collected: 06/30/25 1438    Lab Status: Final result Specimen: Urine, Clean Catch Updated: 06/30/25 1452     RBC, UA 0-1 /hpf      WBC, UA 10-20 /hpf      Epithelial Cells Moderate /hpf      Bacteria, UA Innumerable /hpf     UA w Reflex to Microscopic w Reflex to Culture [[id number]]  (Abnormal) Collected: 06/30/25 1438    Lab Status: Final result Specimen: Urine, Clean Catch Updated: 06/30/25 1444     Color, UA Yellow     Clarity, UA Slightly Cloudy     Specific Gravity, UA 1.025     pH, UA 6.0     Leukocytes, UA Small     Nitrite, UA Positive     Protein, UA Negative mg/dl      Glucose, UA Negative mg/dl      Ketones, UA Negative mg/dl      Urobilinogen, UA <2.0 mg/dl      Bilirubin, UA Negative     Occult Blood, UA Negative    Basic metabolic panel [[id number]] Collected: 06/30/25 1417    Lab Status: Final result Specimen: Blood from Arm, Left Updated: 06/30/25 1439     Sodium 137 mmol/L      Potassium 3.9 mmol/L      Chloride 102 mmol/L      CO2 26 mmol/L      ANION GAP 9 mmol/L      BUN 13 mg/dL      Creatinine 0.74 mg/dL      Glucose 111 mg/dL      Calcium 9.2 mg/dL      eGFR 94 ml/min/1.73sq m     Narrative:      National Kid[employer] Foundation guidelines for Chronic Kidney Disease (CKD):     Stage 1 with normal or high GFR (GFR > 90 mL/min/1.73 square meters)    Stage 2 Mild CKD (GFR = 60-89 mL/min/1.73 square meters)    Stage 3A Moderate CKD (GFR = 45-59 mL/min/1.73 square meters)    Stage 3B Moderate CKD (GFR = 30-44 mL/min/1.73 square meters)    Stage 4 Severe CKD (GFR = 15-29 mL/min/1.73 square  meters)    Stage 5 End Stage CKD (GFR <15 mL/min/1.73 square meters)  Note: GFR calculation is accurate only with a steady state creatinine    CBC and differential [[id number]] Collected: 06/30/25 1417    Lab Status: Final result Specimen: Blood from Arm, Left Updated: 06/30/25 1423     WBC 5.90 Thousand/uL      RBC 4.30 Million/uL      Hemoglobin 13.4 g/dL      Hematocrit 39.7 %      MCV 92 fL      MCH 31.2 pg      MCHC 33.8 g/dL      RDW 12.2 %      MPV 9.1 fL      Platelets 213 Thousands/uL      nRBC 0 /100 WBCs      Segmented % 55 %      Immature Grans % 0 %      Lymphocytes % 38 %      Monocytes % 6 %      Eosinophils Relative 1 %      Basophils Relative 0 %      Absolute Neutrophils 3.20 Thousands/µL      Absolute Immature Grans 0.02 Thousand/uL      Absolute Lymphocytes 2.26 Thousands/µL      Absolute Monocytes 0.35 Thousand/µL      Eosinophils Absolute 0.05 Thousand/µL      Basophils Absolute 0.02 Thousands/µL             No orders to display       Procedures    ED Medication and Procedure Management   Prior to Admission Medications   Prescriptions Last Dose Informant Patient Reported? Taking?   Diclofenac Sodium (VOLTAREN) 1 %   No No   Sig: Apply 2 g topically 4 (four) times a day   cholecalciferol (VITAMIN D3) 1,000 units tablet   No No   Sig: Take 2 tablets (2,000 Units total) by mouth daily To treat vitamin D insufficiency   Patient not taking: Reported on 2/3/2025   lidocaine (Lidoderm) 5 %   No No   Sig: Apply 1 patch topically over 12 hours daily Remove & Discard patch within 12 hours or as directed by MD   Patient not taking: Reported on 2/3/2025   meloxicam (Mobic) 7.5 mg tablet   No No   Sig: Take 2 tablets (15 mg total) by mouth daily for 10 days   methocarbamol (ROBAXIN) 500 mg tablet   No No   Sig: Take 2 tablets (1,000 mg total) by mouth daily at bedtime as needed for muscle spasms   methocarbamol (ROBAXIN) 500 mg tablet   No No   Sig: Take 1 tablet (500 mg total) by mouth 2 (two) times a day    Patient not taking: Reported on 2/3/2025   methocarbamol (ROBAXIN) 500 mg tablet   No No   Sig: Take 1 tablet (500 mg total) by mouth 2 (two) times a day   Patient not taking: Reported on 2/3/2025   naproxen (Naprosyn) 500 mg tablet   No No   Sig: Take 1 tablet (500 mg total) by mouth 2 (two) times a day with meals   Patient not taking: Reported on 2/3/2025   traMADol (ULTRAM) 50 mg tablet   Yes No   Sig: Take 50 mg by mouth every 6 (six) hours as needed for moderate pain      Facility-Administered Medications: None     Discharge Medication List as of 6/30/2025  4:02 PM        START taking these medications    Details   cephalexin (KEFLEX) 500 mg capsule Take 1 capsule (500 mg total) by mouth every 6 (six) hours for 7 days, Starting Mon 6/30/2025, Until Mon 7/7/2025, Normal      methylPREDNISolone 4 MG tablet therapy pack Use as directed on package, Normal      oxyCODONE (Roxicodone) 5 immediate release tablet Take 1 tablet (5 mg total) by mouth every 6 (six) hours as needed for moderate pain for up to 10 doses Max Daily Amount: 20 mg, Starting Mon 6/30/2025, Normal           CONTINUE these medications which have NOT CHANGED    Details   cholecalciferol (VITAMIN D3) 1,000 units tablet Take 2 tablets (2,000 Units total) by mouth daily To treat vitamin D insufficiency, Starting Thu 12/28/2023, Normal      Diclofenac Sodium (VOLTAREN) 1 % Apply 2 g topically 4 (four) times a day, Starting Tue 9/10/2024, Until Mon 2/3/2025, Normal      lidocaine (Lidoderm) 5 % Apply 1 patch topically over 12 hours daily Remove & Discard patch within 12 hours or as directed by MD, Starting Thu 12/21/2023, Normal      meloxicam (Mobic) 7.5 mg tablet Take 2 tablets (15 mg total) by mouth daily for 10 days, Starting Tue 12/26/2023, Until Fri 1/5/2024, Normal      !! methocarbamol (ROBAXIN) 500 mg tablet Take 2 tablets (1,000 mg total) by mouth daily at bedtime as needed for muscle spasms, Starting Thu 12/21/2023, Normal      !!  methocarbamol (ROBAXIN) 500 mg tablet Take 1 tablet (500 mg total) by mouth 2 (two) times a day, Starting Mon 1/1/2024, Normal      !! methocarbamol (ROBAXIN) 500 mg tablet Take 1 tablet (500 mg total) by mouth 2 (two) times a day, Starting Mon 6/24/2024, Normal      naproxen (Naprosyn) 500 mg tablet Take 1 tablet (500 mg total) by mouth 2 (two) times a day with meals, Starting Mon 1/1/2024, Normal      traMADol (ULTRAM) 50 mg tablet Take 50 mg by mouth every 6 (six) hours as needed for moderate pain, Historical Med       !! - Potential duplicate medications found. Please discuss with provider.        No discharge procedures on file.  ED SEPSIS DOCUMENTATION   Time reflects when diagnosis was documented in both MDM as applicable and the Disposition within this note       Time User Action Codes Description Comment    6/30/2025  3:51 PM Henrique Dickey Add [S39.012A] Strain of lumbar region, initial encounter     6/30/2025  3:51 PM Henrique Dickey Add [M54.50,  G89.29] Acute exacerbation of chronic low back pain     6/30/2025  3:52 PM Henrique Dickey Add [N39.0] UTI (urinary tract infection)                      [1]   Past Medical History:  Diagnosis Date    COPD (chronic obstructive pulmonary disease) (HCC)    [2]   Past Surgical History:  Procedure Laterality Date    APPENDECTOMY      GALLBLADDER SURGERY      HERNIA REPAIR      REMOVAL BLADDER STIMULATOR      TUBAL LIGATION     [3]   Family History  Problem Relation Name Age of Onset    No Known Problems Mother      No Known Problems Father     [4]   Social History  Tobacco Use    Smoking status: Former     Current packs/day: 2.00     Types: Cigarettes    Smokeless tobacco: Never   Vaping Use    Vaping status: Every Day    Substances: Nicotine   Substance Use Topics    Alcohol use: Not Currently     Comment: social drinker    Drug use: Never        Henrique Dickey DO  07/02/25 1024

## 2025-07-02 LAB — BACTERIA UR CULT: ABNORMAL
